# Patient Record
Sex: FEMALE | Race: BLACK OR AFRICAN AMERICAN | NOT HISPANIC OR LATINO
[De-identification: names, ages, dates, MRNs, and addresses within clinical notes are randomized per-mention and may not be internally consistent; named-entity substitution may affect disease eponyms.]

---

## 2017-01-06 ENCOUNTER — APPOINTMENT (OUTPATIENT)
Dept: OBGYN | Facility: CLINIC | Age: 42
End: 2017-01-06

## 2017-01-06 VITALS
BODY MASS INDEX: 34.04 KG/M2 | WEIGHT: 185 LBS | DIASTOLIC BLOOD PRESSURE: 74 MMHG | HEIGHT: 62 IN | SYSTOLIC BLOOD PRESSURE: 109 MMHG

## 2017-01-06 DIAGNOSIS — Z82.49 FAMILY HISTORY OF ISCHEMIC HEART DISEASE AND OTHER DISEASES OF THE CIRCULATORY SYSTEM: ICD-10-CM

## 2017-01-06 DIAGNOSIS — R42 DIZZINESS AND GIDDINESS: ICD-10-CM

## 2017-01-06 DIAGNOSIS — G43.909 MIGRAINE, UNSPECIFIED, NOT INTRACTABLE, W/OUT STATUS MIGRAINOSUS: ICD-10-CM

## 2017-01-06 DIAGNOSIS — Z87.59 PERSONAL HISTORY OF OTHER COMPLICATIONS OF PREGNANCY, CHILDBIRTH AND THE PUERPERIUM: ICD-10-CM

## 2017-01-06 RX ORDER — IBUPROFEN 800 MG
800 TABLET ORAL
Refills: 0 | Status: ACTIVE | COMMUNITY

## 2017-02-24 ENCOUNTER — OUTPATIENT (OUTPATIENT)
Dept: OUTPATIENT SERVICES | Facility: HOSPITAL | Age: 42
LOS: 1 days | End: 2017-02-24

## 2017-02-24 VITALS
TEMPERATURE: 98 F | DIASTOLIC BLOOD PRESSURE: 76 MMHG | OXYGEN SATURATION: 98 % | RESPIRATION RATE: 16 BRPM | WEIGHT: 186.07 LBS | HEIGHT: 62 IN | SYSTOLIC BLOOD PRESSURE: 118 MMHG | HEART RATE: 83 BPM

## 2017-02-24 DIAGNOSIS — N84.0 POLYP OF CORPUS UTERI: ICD-10-CM

## 2017-02-24 DIAGNOSIS — O03.9 COMPLETE OR UNSPECIFIED SPONTANEOUS ABORTION WITHOUT COMPLICATION: Chronic | ICD-10-CM

## 2017-02-24 DIAGNOSIS — Z98.890 OTHER SPECIFIED POSTPROCEDURAL STATES: Chronic | ICD-10-CM

## 2017-02-24 DIAGNOSIS — R23.8 OTHER SKIN CHANGES: ICD-10-CM

## 2017-02-24 LAB
APTT BLD: 37.2 SEC — SIGNIFICANT CHANGE UP (ref 27.5–37.4)
HCT VFR BLD CALC: 32.9 % — LOW (ref 34.5–45)
HGB BLD-MCNC: 10.5 G/DL — LOW (ref 11.5–15.5)
INR BLD: 1.2 — HIGH (ref 0.87–1.18)
MCHC RBC-ENTMCNC: 26.5 PG — LOW (ref 27–34)
MCHC RBC-ENTMCNC: 31.9 % — LOW (ref 32–36)
MCV RBC AUTO: 83.1 FL — SIGNIFICANT CHANGE UP (ref 80–100)
PLATELET # BLD AUTO: 346 K/UL — SIGNIFICANT CHANGE UP (ref 150–400)
PMV BLD: 10.2 FL — SIGNIFICANT CHANGE UP (ref 7–13)
PROTHROM AB SERPL-ACNC: 13.7 SEC — HIGH (ref 10–13.1)
RBC # BLD: 3.96 M/UL — SIGNIFICANT CHANGE UP (ref 3.8–5.2)
RBC # FLD: 14.2 % — SIGNIFICANT CHANGE UP (ref 10.3–14.5)
WBC # BLD: 7.62 K/UL — SIGNIFICANT CHANGE UP (ref 3.8–10.5)
WBC # FLD AUTO: 7.62 K/UL — SIGNIFICANT CHANGE UP (ref 3.8–10.5)

## 2017-02-24 RX ORDER — SODIUM CHLORIDE 9 MG/ML
1000 INJECTION, SOLUTION INTRAVENOUS
Qty: 0 | Refills: 0 | Status: DISCONTINUED | OUTPATIENT
Start: 2017-03-07 | End: 2017-03-08

## 2017-02-24 NOTE — H&P PST ADULT - HISTORY OF PRESENT ILLNESS
41 yo female with  PMH uterine fibroids presents to pre surgical testing. Pt states in July 2016 she started to experience menorrhagia.  Pt went to OB,  ultra sound done, nonconclusive. Hysteroscopy done Jan 2017, uterine polyps found.  Pt scheduled for dilation and curettahe hysteroscopy with symphion on 3/7/17. 43 yo female with PMH uterine fibroids presents to pre surgical testing. Pt states in July 2016 she started to experience menorrhagia.   Pt went to OB,  ultra sound done, nonconclusive. Hysteroscopy done Jan 2017, uterine polyps found. Pt states that menses is regular. Pt scheduled for dilation and curettage hysteroscopy with ashleyhion on 3/7/17.

## 2017-02-24 NOTE — H&P PST ADULT - NEGATIVE MUSCULOSKELETAL SYMPTOMS
no joint swelling/no muscle weakness/no back pain/no leg pain L/no arthritis/no muscle cramps/no stiffness/no arm pain L/no neck pain/no leg pain R/no arm pain R/no arthralgia/no myalgia

## 2017-02-24 NOTE — H&P PST ADULT - NEGATIVE ENMT SYMPTOMS
no ear pain/no recurrent cold sores/no sinus symptoms/no post-nasal discharge/no gum bleeding/no throat pain/no dysphagia/no vertigo/no nasal discharge/no abnormal taste sensation/no tinnitus/no hearing difficulty/no nasal congestion/no nasal obstruction/no nose bleeds/no dry mouth

## 2017-02-24 NOTE — H&P PST ADULT - LYMPHATIC
posterior cervical L/posterior cervical R/anterior cervical L/supraclavicular L/supraclavicular R/anterior cervical R

## 2017-02-24 NOTE — H&P PST ADULT - PROBLEM SELECTOR PLAN 1
Pt scheduled for dilation and curettage hysteroscopy with erwin on 3/7/17.   Pre op instructions including chlorhexidine wash given and pt abler to verbalize understanding. Sterile cup given, pt instructed to bring urine sample AM of surgery for UCG and pt able to verbalize understanding.

## 2017-02-24 NOTE — H&P PST ADULT - MUSCULOSKELETAL
details… detailed exam no joint swelling/no joint erythema/ROM intact/no joint warmth/no calf tenderness/normal strength

## 2017-02-24 NOTE — H&P PST ADULT - NEGATIVE NEUROLOGICAL SYMPTOMS
no transient paralysis/no weakness/no paresthesias/no hemiparesis/no difficulty walking/no loss of consciousness/no tremors/no headache/no focal seizures/no syncope/no confusion/no generalized seizures/no vertigo/no loss of sensation/no facial palsy

## 2017-02-24 NOTE — H&P PST ADULT - NSANTHOSAYNRD_GEN_A_CORE
No. ELAN screening performed.  STOP BANG Legend: 0-2 = LOW Risk; 3-4 = INTERMEDIATE Risk; 5-8 = HIGH Risk

## 2017-02-24 NOTE — H&P PST ADULT - NEGATIVE CARDIOVASCULAR SYMPTOMS
no orthopnea/no palpitations/no dyspnea on exertion/no claudication/no chest pain/no paroxysmal nocturnal dyspnea/no peripheral edema

## 2017-02-24 NOTE — H&P PST ADULT - GASTROINTESTINAL DETAILS
soft/nontender/no guarding/no organomegaly/no masses palpable/no rebound tenderness/bowel sounds normal/no bruit/no distention nontender/no distention/soft/no rebound tenderness/no masses palpable/bowel sounds normal

## 2017-02-24 NOTE — H&P PST ADULT - NEGATIVE OPHTHALMOLOGIC SYMPTOMS
no discharge L/no pain L/no irritation R/no diplopia/no blurred vision L/no pain R/no loss of vision L/no lacrimation R/no blurred vision R/no discharge R/no irritation L/no photophobia/no lacrimation L/no loss of vision R

## 2017-03-06 ENCOUNTER — RESULT REVIEW (OUTPATIENT)
Age: 42
End: 2017-03-06

## 2017-03-07 ENCOUNTER — OUTPATIENT (OUTPATIENT)
Dept: OUTPATIENT SERVICES | Facility: HOSPITAL | Age: 42
LOS: 1 days | Discharge: HOME | End: 2017-03-07
Payer: COMMERCIAL

## 2017-03-07 ENCOUNTER — APPOINTMENT (OUTPATIENT)
Dept: OBGYN | Facility: HOSPITAL | Age: 42
End: 2017-03-07

## 2017-03-07 ENCOUNTER — TRANSCRIPTION ENCOUNTER (OUTPATIENT)
Age: 42
End: 2017-03-07

## 2017-03-07 VITALS
TEMPERATURE: 98 F | DIASTOLIC BLOOD PRESSURE: 83 MMHG | SYSTOLIC BLOOD PRESSURE: 123 MMHG | OXYGEN SATURATION: 98 % | HEART RATE: 76 BPM | RESPIRATION RATE: 14 BRPM

## 2017-03-07 VITALS
OXYGEN SATURATION: 100 % | TEMPERATURE: 98 F | SYSTOLIC BLOOD PRESSURE: 131 MMHG | HEIGHT: 60 IN | RESPIRATION RATE: 16 BRPM | DIASTOLIC BLOOD PRESSURE: 66 MMHG | WEIGHT: 186.07 LBS | HEART RATE: 75 BPM

## 2017-03-07 DIAGNOSIS — N84.0 POLYP OF CORPUS UTERI: ICD-10-CM

## 2017-03-07 DIAGNOSIS — O03.9 COMPLETE OR UNSPECIFIED SPONTANEOUS ABORTION WITHOUT COMPLICATION: Chronic | ICD-10-CM

## 2017-03-07 DIAGNOSIS — Z98.890 OTHER SPECIFIED POSTPROCEDURAL STATES: Chronic | ICD-10-CM

## 2017-03-07 PROCEDURE — 88305 TISSUE EXAM BY PATHOLOGIST: CPT | Mod: 26

## 2017-03-07 PROCEDURE — 58562 HYSTEROSCOPY REMOVE FB: CPT

## 2017-03-07 RX ORDER — SODIUM CHLORIDE 9 MG/ML
1000 INJECTION, SOLUTION INTRAVENOUS
Qty: 0 | Refills: 0 | Status: DISCONTINUED | OUTPATIENT
Start: 2017-03-07 | End: 2017-03-08

## 2017-03-07 RX ADMIN — SODIUM CHLORIDE 125 MILLILITER(S): 9 INJECTION, SOLUTION INTRAVENOUS at 11:42

## 2017-03-07 NOTE — ASU DISCHARGE PLAN (ADULT/PEDIATRIC). - MEDICATION SUMMARY - MEDICATIONS TO TAKE
I will START or STAY ON the medications listed below when I get home from the hospital:    Motrin 800 mg oral tablet  -- 1 tab(s) by mouth 3 times a day, As Needed for migraines last dose on 3/1/17  -- Indication: For home med    meclizine 12.5 mg oral tablet  -- 1 tab(s) by mouth prn, As Needed  -- Indication: For home med    multivitamin  -- 1 tab(s) by mouth once a day in am last dose on 3/1/17  -- Indication: For home med

## 2017-03-07 NOTE — ASU DISCHARGE PLAN (ADULT/PEDIATRIC). - NURSING INSTRUCTIONS
You were given IV Tylenol for pain management.  Please DO NOT take tylenol for the next 8 hours (until _______ ).  You were given Toradol for pain management. Please DO Not take Motrin/Ibuprofen (NSAIDS) for the next 6 hours (Until _______). You were given IV Tylenol for pain management.  Please DO NOT take tylenol for the next 8 hours (until 700PM).  You were given Toradol for pain management. Please DO Not take Motrin/Ibuprofen (NSAIDS) for the next 6 hours (Until 700PM).

## 2017-03-07 NOTE — ASU DISCHARGE PLAN (ADULT/PEDIATRIC). - NOTIFY
Pain not relieved by Medications/Persistent Nausea and Vomiting/GYN Fever>100.4/Bleeding that does not stop/Unable to Urinate

## 2017-03-07 NOTE — ASU DISCHARGE PLAN (ADULT/PEDIATRIC). - ACTIVITY LEVEL
no weight bearing/no exercise/nothing per vagina/no tub baths/no douching/nothing per rectum/no tampons/no intercourse/no heavy lifting Nothing in vagina, no intercourse, no douching, no tampons, no tub baths, and no swimming for about 2 weeks or until cleared by MD. Contact your doctor if you are soaking a pad every hour or experience foul smelling discharge./no heavy lifting/no douching/no exercise/nothing per rectum/no tampons/nothing per vagina/no tub baths/no intercourse/no weight bearing

## 2017-03-22 ENCOUNTER — APPOINTMENT (OUTPATIENT)
Dept: OBGYN | Facility: CLINIC | Age: 42
End: 2017-03-22

## 2017-03-30 ENCOUNTER — APPOINTMENT (OUTPATIENT)
Dept: OBGYN | Facility: CLINIC | Age: 42
End: 2017-03-30

## 2017-03-30 VITALS — HEIGHT: 62 IN | BODY MASS INDEX: 34.04 KG/M2 | WEIGHT: 185 LBS

## 2017-03-30 DIAGNOSIS — N84.0 POLYP OF CORPUS UTERI: ICD-10-CM

## 2019-01-10 PROBLEM — N84.0 POLYP OF CORPUS UTERI: Chronic | Status: ACTIVE | Noted: 2017-02-24

## 2019-01-10 PROBLEM — D25.9 LEIOMYOMA OF UTERUS, UNSPECIFIED: Chronic | Status: ACTIVE | Noted: 2017-02-24

## 2019-01-11 ENCOUNTER — APPOINTMENT (OUTPATIENT)
Dept: OBGYN | Facility: CLINIC | Age: 44
End: 2019-01-11
Payer: COMMERCIAL

## 2019-01-11 VITALS
SYSTOLIC BLOOD PRESSURE: 108 MMHG | DIASTOLIC BLOOD PRESSURE: 75 MMHG | WEIGHT: 194 LBS | BODY MASS INDEX: 35.7 KG/M2 | HEIGHT: 62 IN

## 2019-01-11 DIAGNOSIS — Z09 ENCOUNTER FOR FOLLOW-UP EXAMINATION AFTER COMPLETED TREATMENT FOR CONDITIONS OTHER THAN MALIGNANT NEOPLASM: ICD-10-CM

## 2019-01-11 DIAGNOSIS — N89.8 OTHER SPECIFIED NONINFLAMMATORY DISORDERS OF VAGINA: ICD-10-CM

## 2019-01-11 DIAGNOSIS — Z01.419 ENCOUNTER FOR GYNECOLOGICAL EXAMINATION (GENERAL) (ROUTINE) W/OUT ABNORMAL FINDINGS: ICD-10-CM

## 2019-01-11 PROCEDURE — 99396 PREV VISIT EST AGE 40-64: CPT

## 2019-01-11 PROCEDURE — 99213 OFFICE O/P EST LOW 20 MIN: CPT | Mod: 25

## 2019-01-11 NOTE — CHIEF COMPLAINT
[Annual Visit] : annual visit [FreeTextEntry1] : Pt presents for annual\par Thinks she has a yeast infection because sees white discharge. Denies itching \par Has fibroids = would like to talk about removing them.  Has heavy periods, pelvic pain and dyspareunia.  Has 3 children, not sure if she is done with childbearing.

## 2019-01-11 NOTE — HISTORY OF PRESENT ILLNESS
[___ Year(s) Ago] : [unfilled] year(s) ago [Good] : being in good health [Last Mammogram ___] : Last Mammogram was [unfilled] [Last Pap ___] : Last cervical pap smear was [unfilled] [Reproductive Age] : is of reproductive age

## 2019-01-15 ENCOUNTER — RESULT REVIEW (OUTPATIENT)
Age: 44
End: 2019-01-15

## 2019-01-15 LAB
CANDIDA VAG CYTO: DETECTED
G VAGINALIS+PREV SP MTYP VAG QL MICRO: NOT DETECTED
HPV HIGH+LOW RISK DNA PNL CVX: NOT DETECTED
T VAGINALIS VAG QL WET PREP: NOT DETECTED

## 2019-01-16 RX ORDER — FLUCONAZOLE 150 MG/1
150 TABLET ORAL
Qty: 1 | Refills: 0 | Status: DISCONTINUED | COMMUNITY
Start: 2019-01-15 | End: 2019-01-15

## 2019-01-17 LAB — CYTOLOGY CVX/VAG DOC THIN PREP: NORMAL

## 2019-01-23 ENCOUNTER — APPOINTMENT (OUTPATIENT)
Dept: OBGYN | Facility: CLINIC | Age: 44
End: 2019-01-23

## 2019-07-12 NOTE — ASU PREOP CHECKLIST - PATIENT'S PERSONAL PROPERTY GIVEN TO
Patient is interested in Urolift procedure but wants to know if his insurance covers this procedure.  Info given to preverification specialist.   family member

## 2019-07-14 NOTE — H&P PST ADULT - FAMILY HISTORY
Speaking Coherently Mother  Still living? Yes, Estimated age: Age Unknown  Family history of hypertension, Age at diagnosis: Age Unknown

## 2020-02-24 ENCOUNTER — EMERGENCY (EMERGENCY)
Facility: HOSPITAL | Age: 45
LOS: 1 days | Discharge: HOME | End: 2020-02-24
Attending: EMERGENCY MEDICINE
Payer: COMMERCIAL

## 2020-02-24 VITALS
WEIGHT: 195.11 LBS | RESPIRATION RATE: 18 BRPM | TEMPERATURE: 99 F | DIASTOLIC BLOOD PRESSURE: 81 MMHG | OXYGEN SATURATION: 100 % | HEART RATE: 89 BPM | HEIGHT: 62 IN | SYSTOLIC BLOOD PRESSURE: 116 MMHG

## 2020-02-24 VITALS
DIASTOLIC BLOOD PRESSURE: 84 MMHG | TEMPERATURE: 98 F | RESPIRATION RATE: 16 BRPM | SYSTOLIC BLOOD PRESSURE: 134 MMHG | OXYGEN SATURATION: 99 % | HEART RATE: 81 BPM

## 2020-02-24 DIAGNOSIS — O03.9 COMPLETE OR UNSPECIFIED SPONTANEOUS ABORTION WITHOUT COMPLICATION: Chronic | ICD-10-CM

## 2020-02-24 DIAGNOSIS — Z98.890 OTHER SPECIFIED POSTPROCEDURAL STATES: Chronic | ICD-10-CM

## 2020-02-24 LAB
FLU A RESULT: DETECTED
FLU A RESULT: DETECTED
FLUAV AG NPH QL: DETECTED
FLUBV AG NPH QL: SIGNIFICANT CHANGE UP
RSV RESULT: SIGNIFICANT CHANGE UP
RSV RNA RESP QL NAA+PROBE: SIGNIFICANT CHANGE UP

## 2020-02-24 PROCEDURE — 99284 EMERGENCY DEPT VISIT MOD MDM: CPT

## 2020-02-24 PROCEDURE — 71046 X-RAY EXAM CHEST 2 VIEWS: CPT

## 2020-02-24 PROCEDURE — 71046 X-RAY EXAM CHEST 2 VIEWS: CPT | Mod: 26

## 2020-02-24 PROCEDURE — 87631 RESP VIRUS 3-5 TARGETS: CPT

## 2020-02-24 PROCEDURE — 99283 EMERGENCY DEPT VISIT LOW MDM: CPT | Mod: 25

## 2020-02-24 RX ORDER — ACETAMINOPHEN 500 MG
975 TABLET ORAL ONCE
Refills: 0 | Status: COMPLETED | OUTPATIENT
Start: 2020-02-24 | End: 2020-02-24

## 2020-02-24 RX ORDER — SODIUM CHLORIDE 9 MG/ML
1000 INJECTION, SOLUTION INTRAVENOUS ONCE
Refills: 0 | Status: COMPLETED | OUTPATIENT
Start: 2020-02-24 | End: 2020-02-24

## 2020-02-24 RX ADMIN — Medication 975 MILLIGRAM(S): at 12:00

## 2020-02-24 RX ADMIN — Medication 75 MILLIGRAM(S): at 14:22

## 2020-02-24 RX ADMIN — Medication 975 MILLIGRAM(S): at 11:56

## 2020-02-24 RX ADMIN — SODIUM CHLORIDE 2000 MILLILITER(S): 9 INJECTION, SOLUTION INTRAVENOUS at 11:56

## 2020-02-24 NOTE — ED ADULT NURSE NOTE - OBJECTIVE STATEMENT
46 y/o female came in from home complaining of flu-like symptoms for 3 days. AOx4, ambulatory with no significant PMH. Pt. states she has been experiencing fever, body aches, and productive cough for 3 days. Pt. is also around a child with similar symptoms. Pt. reports she did not get the flu shot this year. Pt. denies any abdominal pain, chest pain, N/V/D. Pt. febrile to 101.4 F oral as per triage vital signs. Tylenol administered as per EMAR. Denies any SOB. 20G RAC. pt placed in hospital gown, bed locked and in lowest position with the call bell in reach.

## 2020-02-24 NOTE — ED PROVIDER NOTE - PROGRESS NOTE DETAILS
Attempted to reach patient, she did not answer. [employer] message sent to patient with this information.   Dr. Chava Mesa, PGY-2: flu A positive, informed pt. Tolerating PO. Vitals improved. Offered tamiflu given symptoms started less than 48 hours ago. Informed pt of side effects of tamiflu. Pt would like to start tamiflu. Will give dose here and send prescription to pharmacy. Recommended symptomatic treatment at home. Return precautions provided, pt verbalized understanding. Ready for DC. MD Rylan: patient reevaluated, reports feeling improved, on exam well appearing, lung exam unchanged, now afebrile, will give Tamiflu, strict return precautions given including worsening shortness of breath, chest pain, cough, fever, abdominal pain, or if she had any other new worsening, or concerning symptoms, advised to follow up with primary medical doctor in 2-3 days for reevaluation.

## 2020-02-24 NOTE — ED PROVIDER NOTE - PHYSICAL EXAMINATION
Gen: NAD, AOx3, able to make needs known, non-toxic  Head: NCAT  HEENT: EOMI, oral mucosa moist, normal conjunctiva  Lung: no respiratory distress, speaking in full sentences, crackles L sided, R lung CTA  CV: RRR, no murmurs  Abd: soft, NTND, no guarding  MSK: no visible deformities  Neuro: No focal sensory or motor deficits  Skin: Warm, well perfused  Psych: normal affect

## 2020-02-24 NOTE — ED PROVIDER NOTE - OBJECTIVE STATEMENT
46 y/o F w/ PMH of uterine fibroids presenting w/ flu-like symptoms. Gold room 6. Pt reports Saturday night developing cough. Since then has had headache, body aches, and fevers. Cough productive of sputum. Was concerned because she has been having persistent fevers. Has not been taking medication for the fever. Has been taking mucinex for cough/congestion, last took last night. Did not get flu vaccine this year. Daughter sick w/ URI symptoms recently, but improved after 1 day. No known other sick contacts. Denies dizziness, blurred vision, chest pain, shortness of breath, abdominal pain, n/v/d/c, urinary symptoms, rash.

## 2020-02-24 NOTE — ED PROVIDER NOTE - PATIENT PORTAL LINK FT
You can access the FollowMyHealth Patient Portal offered by [facility] by registering at the following website: [email or web address]. By joining [employer]’s FollowMyHealth portal, you will also be able to view your health information using other applications (apps) compatible with our system.

## 2020-02-24 NOTE — ED PROVIDER NOTE - NSFOLLOWUPINSTRUCTIONS_ED_ALL_ED_FT
You were diagnosed with the flu    Viral Respiratory Infection    A viral respiratory infection is an illness that affects parts of the body used for breathing, like the lungs, nose, and throat. It is caused by a germ called a virus. Symptoms can include runny nose, coughing, sneezing, fatigue, body aches, sore throat, fever, or headache. Over the counter medicine can be used to manage the symptoms but the infection typically goes away on its own in 5 to 10 days.     SEEK IMMEDIATE MEDICAL CARE IF YOU HAVE ANY OF THE FOLLOWING SYMPTOMS: shortness of breath, chest pain, fever over 10 days, or lightheadedness/dizziness.     1) Follow up with your doctor this week  2) Return to the ED immediately for new or worsening symptoms   3) Please continue to take any home medications as prescribed  4) Your test results from your ED visit were discussed with you prior to discharge  5) You were provided with a copy of your test results  6) Please  your medications at the pharmacy and take as directed  7) Please take tylenol 650 mg every 4 hours as needed for pain. Please do not exceed more than 4,000mg of Tylenol in a day   8) Please take Motrin 600mg by mouth every 6 hours as needed for pain. Please take this medication with food.

## 2020-02-24 NOTE — ED PROVIDER NOTE - NSFOLLOWUPCLINICS_GEN_ALL_ED_FT
[facility] General Internal Medicine  General Internal Medicine  [address] [place] [address]  Phone: [phone]  Fax:   Follow Up Time:     [facility]  Internal Medicine  [address] [place] [address]  Phone: [phone]  Fax: [phone]  Follow Up Time:

## 2020-02-24 NOTE — ED PROVIDER NOTE - CLINICAL SUMMARY MEDICAL DECISION MAKING FREE TEXT BOX
46 y/o F w/ PMH of uterine fibroids presenting w/ flu-like symptoms. Given symptoms, likely flu vs. other viral URI. Pt well appearing at this time. L sided crackles, possible pneumonia. Plan for CXR, fluids, tylenol, u-preg. Expect DC after symptomatic improvement. Will reassess the need for additional interventions as clinically warranted.

## 2020-02-24 NOTE — ED PROVIDER NOTE - ATTENDING CONTRIBUTION TO CARE
45 year old female, no past medical history, presents to the ED for generalized malaise. Reports for past 2 days, has had diffuse myalgias, non-productive cough, subjective fevers and chills. No sick contacts or recent travel.     Gen: Well appearing, Well Developed, No Acute Distress  HEENT: Normocephalic, Atraumatic, Pupils equal round and reactive to light, Mucous Membranes dry Trachea Midline, Pharynx non-erythematous  Cards: Regular Rate and Rhythm, No murmurs, No JVD, No pedal edema  Pulm: Lungs with mild rales LLL otherwise normal, no respiratory distress, no accessory muscle use  Abd: abdomen soft, supple, non-tender, no rebound or guarding, bowel sounds normoactive x4, no masses, no pulsatile masses,   Ext: moving all extremities, pulses x4 strong and regular  Neuro: AxOx3, no focal neuro deficits   Back: no tenderness  Skin: warm, dry, no rash     A/P:45 year old female, no past medical history, presents to the ED for generalized malaise, cough, myalgias, likely flu, vs viral syndrome, r/o pneumonia given mild rales LLL, check flu swab, cxr, rehydrate, pain/fever control and reeval.

## 2020-02-28 ENCOUNTER — APPOINTMENT (OUTPATIENT)
Dept: OBGYN | Facility: CLINIC | Age: 45
End: 2020-02-28

## 2020-03-09 ENCOUNTER — EMERGENCY (EMERGENCY)
Facility: HOSPITAL | Age: 45
LOS: 1 days | Discharge: HOME | End: 2020-03-09
Attending: EMERGENCY MEDICINE
Payer: COMMERCIAL

## 2020-03-09 VITALS
HEART RATE: 73 BPM | DIASTOLIC BLOOD PRESSURE: 78 MMHG | SYSTOLIC BLOOD PRESSURE: 125 MMHG | TEMPERATURE: 99 F | RESPIRATION RATE: 18 BRPM | WEIGHT: 184.97 LBS | OXYGEN SATURATION: 99 % | HEIGHT: 62 IN

## 2020-03-09 VITALS
TEMPERATURE: 98 F | HEART RATE: 71 BPM | DIASTOLIC BLOOD PRESSURE: 79 MMHG | OXYGEN SATURATION: 99 % | RESPIRATION RATE: 17 BRPM | SYSTOLIC BLOOD PRESSURE: 112 MMHG

## 2020-03-09 DIAGNOSIS — O03.9 COMPLETE OR UNSPECIFIED SPONTANEOUS ABORTION WITHOUT COMPLICATION: Chronic | ICD-10-CM

## 2020-03-09 DIAGNOSIS — Z98.890 OTHER SPECIFIED POSTPROCEDURAL STATES: Chronic | ICD-10-CM

## 2020-03-09 LAB
ALBUMIN SERPL ELPH-MCNC: 4 G/DL — SIGNIFICANT CHANGE UP (ref 3.3–5)
ALP SERPL-CCNC: 53 U/L — SIGNIFICANT CHANGE UP (ref 40–120)
ALT FLD-CCNC: 10 U/L — SIGNIFICANT CHANGE UP (ref 10–45)
ANION GAP SERPL CALC-SCNC: 10 MMOL/L — SIGNIFICANT CHANGE UP (ref 5–17)
AST SERPL-CCNC: 11 U/L — SIGNIFICANT CHANGE UP (ref 10–40)
BASOPHILS # BLD AUTO: 0.04 K/UL — SIGNIFICANT CHANGE UP (ref 0–0.2)
BASOPHILS NFR BLD AUTO: 0.7 % — SIGNIFICANT CHANGE UP (ref 0–2)
BILIRUB SERPL-MCNC: 0.9 MG/DL — SIGNIFICANT CHANGE UP (ref 0.2–1.2)
BUN SERPL-MCNC: 12 MG/DL — SIGNIFICANT CHANGE UP (ref 7–23)
CALCIUM SERPL-MCNC: 9.5 MG/DL — SIGNIFICANT CHANGE UP (ref 8.4–10.5)
CHLORIDE SERPL-SCNC: 105 MMOL/L — SIGNIFICANT CHANGE UP (ref 96–108)
CO2 SERPL-SCNC: 24 MMOL/L — SIGNIFICANT CHANGE UP (ref 22–31)
CREAT SERPL-MCNC: 0.75 MG/DL — SIGNIFICANT CHANGE UP (ref 0.5–1.3)
EOSINOPHIL # BLD AUTO: 0.1 K/UL — SIGNIFICANT CHANGE UP (ref 0–0.5)
EOSINOPHIL NFR BLD AUTO: 1.6 % — SIGNIFICANT CHANGE UP (ref 0–6)
GLUCOSE SERPL-MCNC: 92 MG/DL — SIGNIFICANT CHANGE UP (ref 70–99)
HCT VFR BLD CALC: 36.2 % — SIGNIFICANT CHANGE UP (ref 34.5–45)
HGB BLD-MCNC: 11 G/DL — LOW (ref 11.5–15.5)
IMM GRANULOCYTES NFR BLD AUTO: 0.2 % — SIGNIFICANT CHANGE UP (ref 0–1.5)
LYMPHOCYTES # BLD AUTO: 2.76 K/UL — SIGNIFICANT CHANGE UP (ref 1–3.3)
LYMPHOCYTES # BLD AUTO: 45 % — HIGH (ref 13–44)
MCHC RBC-ENTMCNC: 25.6 PG — LOW (ref 27–34)
MCHC RBC-ENTMCNC: 30.4 GM/DL — LOW (ref 32–36)
MCV RBC AUTO: 84.4 FL — SIGNIFICANT CHANGE UP (ref 80–100)
MONOCYTES # BLD AUTO: 0.37 K/UL — SIGNIFICANT CHANGE UP (ref 0–0.9)
MONOCYTES NFR BLD AUTO: 6 % — SIGNIFICANT CHANGE UP (ref 2–14)
NEUTROPHILS # BLD AUTO: 2.86 K/UL — SIGNIFICANT CHANGE UP (ref 1.8–7.4)
NEUTROPHILS NFR BLD AUTO: 46.5 % — SIGNIFICANT CHANGE UP (ref 43–77)
NRBC # BLD: 0 /100 WBCS — SIGNIFICANT CHANGE UP (ref 0–0)
PLATELET # BLD AUTO: 352 K/UL — SIGNIFICANT CHANGE UP (ref 150–400)
POTASSIUM SERPL-MCNC: 3.8 MMOL/L — SIGNIFICANT CHANGE UP (ref 3.5–5.3)
POTASSIUM SERPL-SCNC: 3.8 MMOL/L — SIGNIFICANT CHANGE UP (ref 3.5–5.3)
PROT SERPL-MCNC: 7.8 G/DL — SIGNIFICANT CHANGE UP (ref 6–8.3)
RBC # BLD: 4.29 M/UL — SIGNIFICANT CHANGE UP (ref 3.8–5.2)
RBC # FLD: 14.6 % — HIGH (ref 10.3–14.5)
SODIUM SERPL-SCNC: 139 MMOL/L — SIGNIFICANT CHANGE UP (ref 135–145)
TROPONIN T, HIGH SENSITIVITY RESULT: <6 NG/L — SIGNIFICANT CHANGE UP (ref 0–51)
WBC # BLD: 6.14 K/UL — SIGNIFICANT CHANGE UP (ref 3.8–10.5)
WBC # FLD AUTO: 6.14 K/UL — SIGNIFICANT CHANGE UP (ref 3.8–10.5)

## 2020-03-09 PROCEDURE — 99284 EMERGENCY DEPT VISIT MOD MDM: CPT | Mod: 25

## 2020-03-09 PROCEDURE — 71046 X-RAY EXAM CHEST 2 VIEWS: CPT | Mod: 26

## 2020-03-09 PROCEDURE — 99285 EMERGENCY DEPT VISIT HI MDM: CPT

## 2020-03-09 PROCEDURE — 93005 ELECTROCARDIOGRAM TRACING: CPT

## 2020-03-09 PROCEDURE — 71046 X-RAY EXAM CHEST 2 VIEWS: CPT

## 2020-03-09 PROCEDURE — 84484 ASSAY OF TROPONIN QUANT: CPT

## 2020-03-09 PROCEDURE — 80053 COMPREHEN METABOLIC PANEL: CPT

## 2020-03-09 PROCEDURE — 85027 COMPLETE CBC AUTOMATED: CPT

## 2020-03-09 RX ORDER — ACETAMINOPHEN 500 MG
650 TABLET ORAL ONCE
Refills: 0 | Status: COMPLETED | OUTPATIENT
Start: 2020-03-09 | End: 2020-03-09

## 2020-03-09 RX ORDER — IBUPROFEN 200 MG
600 TABLET ORAL ONCE
Refills: 0 | Status: COMPLETED | OUTPATIENT
Start: 2020-03-09 | End: 2020-03-09

## 2020-03-09 RX ADMIN — Medication 650 MILLIGRAM(S): at 12:40

## 2020-03-09 RX ADMIN — Medication 600 MILLIGRAM(S): at 12:40

## 2020-03-09 NOTE — ED PROVIDER NOTE - NSFOLLOWUPINSTRUCTIONS_ED_ALL_ED_FT
- Lab and imaging results, if performed, were discussed with you along with your discharge diagnosis    - Follow up with your doctor in 1 week - bring copies of your results if you were given. If you do not have a primary doctor, please call [phone] to find one convenient for you    - Return to the ED for any new, worsening, or concerning symptoms to you    - Continue all prescribed medications    - Take ibuprofen/tylenol as directed as needed for pain  To control your pain at home, you should take Ibuprofen 400 mg along with Tylenol 650mg-1000mg every 6 to 8 hours. Limit your maximum daily Tylenol from all sources to 4000mg. Be aware that many other medications contain acetaminophen which is also known as Tylenol. Taking Tylenol and Ibuprofen together has been shown to be more effective at relieving pain than taking them separately. These are both over the counter medications that you can  at your local pharmacy without a prescription. You need to respect all of the warnings on the bottles. You shouldn’t take these medications for more than a week without following up with your doctor. Both medications come with certain risks and side effects that you need to discuss with your doctor, especially if you are taking them for a prolonged period.    - Rest and keep yourself hydrated with fluids

## 2020-03-09 NOTE — ED ADULT TRIAGE NOTE - CHIEF COMPLAINT QUOTE
left sided chest pain radiating down the left arm with numbness to the left thumb x the passed 3 days, had flu last week and was taking tamiflu.

## 2020-03-09 NOTE — ED PROVIDER NOTE - NS ED ROS FT
CONST: no fevers, no chills  EYES: no pain, no vision changes  ENT: no sore throat, no ear pain, no change in hearing  CV: + chest pain, no palpitations, no leg swelling  RESP: no shortness of breath, no cough  ABD: no abdominal pain, no nausea, no vomiting, no diarrhea  : no dysuria, no flank pain, no hematuria  MSK: no back pain, no extremity pain  NEURO: +L arm numbness, no headache or additional neurologic complaints   HEME: no easy bleeding  SKIN:  no rash

## 2020-03-09 NOTE — ED PROVIDER NOTE - PATIENT PORTAL LINK FT
You can access the FollowMyHealth Patient Portal offered by [facility] by registering at the following website: [email or web address]. By joining [employer]’s FollowMyHealth portal, you will also be able to view your health information using other applications (apps) compatible with our system.

## 2020-03-09 NOTE — ED PROVIDER NOTE - CLINICAL SUMMARY MEDICAL DECISION MAKING FREE TEXT BOX
45y F w/ no significant PMHx presenting with L-sided cp radiating to her L arm w/ associated numbness/tingling x3 days. HEART Score 1, low suspicion of ACS, with pain and numbness reproducible on PE. Likely MSK in nature, will w/u for ACS, will treat patient's pain and reassess, patient refused Valium, will treat w/ Tylenol/Motrin. 45y F w/ no significant PMHx presenting with L-sided cp radiating to her L arm w/ associated numbness/tingling x3 days. HEART Score 1, low suspicion of ACS, with pain and numbness reproducible on PE. Likely MSK in nature, will w/u for ACS, will treat patient's pain and reassess, patient refused Valium, will treat w/ Tylenol/Motrin.  Yusuf: Patient with cp radiating to left arm x 3 days. will get labs, ekg, cxr, cardiac enzymes. likely outpatient f/u. patient has worsening pain with movmnt.

## 2020-03-09 NOTE — ED PROVIDER NOTE - PROGRESS NOTE DETAILS
Patient endorses alleviation of pain of LUE after pain medications administered. Discussed f/u instructions and strict return precautions, patient agreeable. Patient medically stable for discharge.   Ely Drummond D.O. (PGY-1)

## 2020-03-09 NOTE — ED PROVIDER NOTE - PHYSICAL EXAMINATION
Physical Exam:  Gen: NAD, non-toxic appearing, awake alert   Head: NCAT  HEENT: EOMI, PEERL, normal conjunctiva, oral mucosa moist  Lung: CTAB, no respiratory distress, no wheezes/rhonchi/rales B/L, speaking in full sentences  CV: RRR, no murmurs, rubs or gallops  Chest wall: +ttp to L anterior chest wall   Abd: soft, NT, ND, no guarding, no rigidity  MSK: no visible deformities, ROM normal in UE/LE, no spinal tenderness   Neuro: +reverse phalen's test, CN2-12 grossly intact, A&Ox3, MS +5/5 in UE and LE BL, finger to nose smooth and rapid, gross sensation intact in UE and LE BL, gait smooth and coordinated  Skin: Warm, well perfused, no rash, no leg swelling  Psych: normal affect, calm  ~Ely Drummond D.O. -Resident

## 2020-03-09 NOTE — ED ADULT NURSE NOTE - OBJECTIVE STATEMENT
45y female arrived to ED complaining of CP. Patient reports CP x3 days, constant, L-sided and radiating down left arm +numbness down the left arm. Pain described as sharp. Patient reports that pain is not worsened or relieved by much. Patient denies SOB, n/v/d, abd pain, chills, fever. Patient endorses cough, recently dx w/ flu and finished course of Tamiflu. Pt A&Ox4, ambulatory, VS stable. Cardiac monitor placed on pt at bedside. No significant PMHx.

## 2020-03-09 NOTE — ED PROVIDER NOTE - OBJECTIVE STATEMENT
45y F w/ no significant PMHx presenting with L-sided cp radiating to her L arm w/ associated numbness/tingling x3 days. Patient states pain has been constant in nature since onset. States her chest pain is exacerbated with coughing. States she was diagnosed with the flu one week ago, endorses alleviation of her flu-like symptoms. Patient states L arm numbness and chest pain reproducible with movement. Denies associated diaphoresis, dizziness, headache, visual changes, sob, nausea, vomiting, focal weakness/numbness.

## 2020-03-29 ENCOUNTER — TRANSCRIPTION ENCOUNTER (OUTPATIENT)
Age: 45
End: 2020-03-29

## 2020-12-21 PROBLEM — Z01.419 ENCOUNTER FOR ANNUAL ROUTINE GYNECOLOGICAL EXAMINATION: Status: RESOLVED | Noted: 2019-01-11 | Resolved: 2020-12-21

## 2021-02-12 ENCOUNTER — APPOINTMENT (OUTPATIENT)
Dept: OBGYN | Facility: CLINIC | Age: 46
End: 2021-02-12

## 2021-03-11 ENCOUNTER — APPOINTMENT (OUTPATIENT)
Dept: OBGYN | Facility: CLINIC | Age: 46
End: 2021-03-11
Payer: COMMERCIAL

## 2021-03-11 VITALS
HEIGHT: 62 IN | WEIGHT: 185 LBS | DIASTOLIC BLOOD PRESSURE: 94 MMHG | BODY MASS INDEX: 34.04 KG/M2 | SYSTOLIC BLOOD PRESSURE: 128 MMHG

## 2021-03-11 DIAGNOSIS — Z01.419 ENCOUNTER FOR GYNECOLOGICAL EXAMINATION (GENERAL) (ROUTINE) W/OUT ABNORMAL FINDINGS: ICD-10-CM

## 2021-03-11 DIAGNOSIS — A60.00 HERPESVIRAL INFECTION OF UROGENITAL SYSTEM, UNSPECIFIED: ICD-10-CM

## 2021-03-11 PROCEDURE — 99396 PREV VISIT EST AGE 40-64: CPT

## 2021-03-11 PROCEDURE — 99072 ADDL SUPL MATRL&STAF TM PHE: CPT

## 2021-03-11 PROCEDURE — 36415 COLL VENOUS BLD VENIPUNCTURE: CPT

## 2021-03-11 NOTE — DISCUSSION/SUMMARY
[FreeTextEntry1] : reviewed management options discussed last time \par pt would like to consider [place]\par pt would need EMB first - pt thinks she would prefer under anesthesia - will start with funmi

## 2021-03-11 NOTE — PHYSICAL EXAM
[Appropriately responsive] : appropriately responsive [Alert] : alert [No Acute Distress] : no acute distress [Soft] : soft [Non-tender] : non-tender [Non-distended] : non-distended [No Lesions] : no lesions [No Mass] : no mass [Oriented x3] : oriented x3 [Examination Of The Breasts] : a normal appearance [No Masses] : no breast masses were palpable [Labia Majora] : normal [Labia Minora] : normal [Normal] : normal [Uterine Adnexae] : normal [FreeTextEntry1] : +ulceration right labia - pt reports h/o hsv

## 2021-03-11 NOTE — HISTORY OF PRESENT ILLNESS
[TextBox_4] : Pt states her stomach got really big and can feel the fibroids moving sometimes.  Period is off - spots for 3 days before period comes and then gets period for 3 days. Used to be very heavy and last longer.   [PapSmeardate] : 2019

## 2021-03-12 LAB — HIV1+2 AB SPEC QL IA.RAPID: NONREACTIVE

## 2021-03-15 LAB
C TRACH RRNA SPEC QL NAA+PROBE: NOT DETECTED
HBV SURFACE AG SER QL: NONREACTIVE
HCV AB SER QL: NONREACTIVE
HCV S/CO RATIO: 0.09 S/CO
HPV HIGH+LOW RISK DNA PNL CVX: NOT DETECTED
N GONORRHOEA RRNA SPEC QL NAA+PROBE: NOT DETECTED
SOURCE AMPLIFICATION: NORMAL
SOURCE AMPLIFICATION: NORMAL
T PALLIDUM AB SER QL IA: NEGATIVE
T VAGINALIS RRNA SPEC QL NAA+PROBE: NOT DETECTED

## 2021-03-17 LAB — CYTOLOGY CVX/VAG DOC THIN PREP: ABNORMAL

## 2021-03-23 ENCOUNTER — APPOINTMENT (OUTPATIENT)
Dept: OBGYN | Facility: CLINIC | Age: 46
End: 2021-03-23
Payer: COMMERCIAL

## 2021-03-23 ENCOUNTER — ASOB RESULT (OUTPATIENT)
Age: 46
End: 2021-03-23

## 2021-03-23 PROCEDURE — 76830 TRANSVAGINAL US NON-OB: CPT

## 2021-03-23 PROCEDURE — 99072 ADDL SUPL MATRL&STAF TM PHE: CPT

## 2021-03-26 ENCOUNTER — NON-APPOINTMENT (OUTPATIENT)
Age: 46
End: 2021-03-26

## 2021-04-08 ENCOUNTER — RESULT REVIEW (OUTPATIENT)
Age: 46
End: 2021-04-08

## 2021-04-08 ENCOUNTER — OUTPATIENT (OUTPATIENT)
Dept: OUTPATIENT SERVICES | Facility: HOSPITAL | Age: 46
LOS: 1 days | End: 2021-04-08
Payer: COMMERCIAL

## 2021-04-08 ENCOUNTER — APPOINTMENT (OUTPATIENT)
Dept: MAMMOGRAPHY | Facility: CLINIC | Age: 46
End: 2021-04-08
Payer: COMMERCIAL

## 2021-04-08 DIAGNOSIS — Z01.419 ENCOUNTER FOR GYNECOLOGICAL EXAMINATION (GENERAL) (ROUTINE) WITHOUT ABNORMAL FINDINGS: ICD-10-CM

## 2021-04-08 DIAGNOSIS — Z98.890 OTHER SPECIFIED POSTPROCEDURAL STATES: Chronic | ICD-10-CM

## 2021-04-08 DIAGNOSIS — O03.9 COMPLETE OR UNSPECIFIED SPONTANEOUS ABORTION WITHOUT COMPLICATION: Chronic | ICD-10-CM

## 2021-04-08 DIAGNOSIS — Z00.8 ENCOUNTER FOR OTHER GENERAL EXAMINATION: ICD-10-CM

## 2021-04-08 PROCEDURE — 77063 BREAST TOMOSYNTHESIS BI: CPT | Mod: 26

## 2021-04-08 PROCEDURE — 77067 SCR MAMMO BI INCL CAD: CPT | Mod: 26

## 2021-04-08 PROCEDURE — 77063 BREAST TOMOSYNTHESIS BI: CPT

## 2021-04-08 PROCEDURE — 77067 SCR MAMMO BI INCL CAD: CPT

## 2021-04-14 ENCOUNTER — EMERGENCY (EMERGENCY)
Facility: HOSPITAL | Age: 46
LOS: 1 days | Discharge: HOME | End: 2021-04-14
Attending: EMERGENCY MEDICINE
Payer: COMMERCIAL

## 2021-04-14 VITALS
OXYGEN SATURATION: 100 % | DIASTOLIC BLOOD PRESSURE: 81 MMHG | HEART RATE: 80 BPM | TEMPERATURE: 98 F | RESPIRATION RATE: 18 BRPM | HEIGHT: 62 IN | WEIGHT: 184.97 LBS | SYSTOLIC BLOOD PRESSURE: 116 MMHG

## 2021-04-14 VITALS
OXYGEN SATURATION: 98 % | TEMPERATURE: 98 F | DIASTOLIC BLOOD PRESSURE: 86 MMHG | SYSTOLIC BLOOD PRESSURE: 114 MMHG | RESPIRATION RATE: 18 BRPM | HEART RATE: 78 BPM

## 2021-04-14 DIAGNOSIS — O03.9 COMPLETE OR UNSPECIFIED SPONTANEOUS ABORTION WITHOUT COMPLICATION: Chronic | ICD-10-CM

## 2021-04-14 DIAGNOSIS — Z98.890 OTHER SPECIFIED POSTPROCEDURAL STATES: Chronic | ICD-10-CM

## 2021-04-14 LAB
ALBUMIN SERPL ELPH-MCNC: 4 G/DL — SIGNIFICANT CHANGE UP (ref 3.3–5)
ALP SERPL-CCNC: 64 U/L — SIGNIFICANT CHANGE UP (ref 40–120)
ALT FLD-CCNC: 11 U/L — SIGNIFICANT CHANGE UP (ref 10–45)
ANION GAP SERPL CALC-SCNC: 11 MMOL/L — SIGNIFICANT CHANGE UP (ref 5–17)
APPEARANCE UR: CLEAR — SIGNIFICANT CHANGE UP
AST SERPL-CCNC: 18 U/L — SIGNIFICANT CHANGE UP (ref 10–40)
BACTERIA # UR AUTO: NEGATIVE — SIGNIFICANT CHANGE UP
BASOPHILS # BLD AUTO: 0.04 K/UL — SIGNIFICANT CHANGE UP (ref 0–0.2)
BASOPHILS NFR BLD AUTO: 0.5 % — SIGNIFICANT CHANGE UP (ref 0–2)
BILIRUB SERPL-MCNC: 0.6 MG/DL — SIGNIFICANT CHANGE UP (ref 0.2–1.2)
BILIRUB UR-MCNC: NEGATIVE — SIGNIFICANT CHANGE UP
BUN SERPL-MCNC: 12 MG/DL — SIGNIFICANT CHANGE UP (ref 7–23)
CALCIUM SERPL-MCNC: 9.1 MG/DL — SIGNIFICANT CHANGE UP (ref 8.4–10.5)
CHLORIDE SERPL-SCNC: 107 MMOL/L — SIGNIFICANT CHANGE UP (ref 96–108)
CO2 SERPL-SCNC: 25 MMOL/L — SIGNIFICANT CHANGE UP (ref 22–31)
COLOR SPEC: YELLOW — SIGNIFICANT CHANGE UP
CREAT SERPL-MCNC: 0.7 MG/DL — SIGNIFICANT CHANGE UP (ref 0.5–1.3)
DIFF PNL FLD: NEGATIVE — SIGNIFICANT CHANGE UP
EOSINOPHIL # BLD AUTO: 0.12 K/UL — SIGNIFICANT CHANGE UP (ref 0–0.5)
EOSINOPHIL NFR BLD AUTO: 1.4 % — SIGNIFICANT CHANGE UP (ref 0–6)
EPI CELLS # UR: 1 /HPF — SIGNIFICANT CHANGE UP
GLUCOSE SERPL-MCNC: 88 MG/DL — SIGNIFICANT CHANGE UP (ref 70–99)
GLUCOSE UR QL: NEGATIVE — SIGNIFICANT CHANGE UP
HCG SERPL-ACNC: <2 MIU/ML — SIGNIFICANT CHANGE UP
HCT VFR BLD CALC: 39.1 % — SIGNIFICANT CHANGE UP (ref 34.5–45)
HGB BLD-MCNC: 12.1 G/DL — SIGNIFICANT CHANGE UP (ref 11.5–15.5)
HYALINE CASTS # UR AUTO: 1 /LPF — SIGNIFICANT CHANGE UP (ref 0–2)
IMM GRANULOCYTES NFR BLD AUTO: 0.2 % — SIGNIFICANT CHANGE UP (ref 0–1.5)
KETONES UR-MCNC: NEGATIVE — SIGNIFICANT CHANGE UP
LEUKOCYTE ESTERASE UR-ACNC: NEGATIVE — SIGNIFICANT CHANGE UP
LIDOCAIN IGE QN: 25 U/L — SIGNIFICANT CHANGE UP (ref 7–60)
LYMPHOCYTES # BLD AUTO: 2.65 K/UL — SIGNIFICANT CHANGE UP (ref 1–3.3)
LYMPHOCYTES # BLD AUTO: 30.5 % — SIGNIFICANT CHANGE UP (ref 13–44)
MCHC RBC-ENTMCNC: 26.8 PG — LOW (ref 27–34)
MCHC RBC-ENTMCNC: 30.9 GM/DL — LOW (ref 32–36)
MCV RBC AUTO: 86.7 FL — SIGNIFICANT CHANGE UP (ref 80–100)
MONOCYTES # BLD AUTO: 0.49 K/UL — SIGNIFICANT CHANGE UP (ref 0–0.9)
MONOCYTES NFR BLD AUTO: 5.6 % — SIGNIFICANT CHANGE UP (ref 2–14)
NEUTROPHILS # BLD AUTO: 5.37 K/UL — SIGNIFICANT CHANGE UP (ref 1.8–7.4)
NEUTROPHILS NFR BLD AUTO: 61.8 % — SIGNIFICANT CHANGE UP (ref 43–77)
NITRITE UR-MCNC: NEGATIVE — SIGNIFICANT CHANGE UP
NRBC # BLD: 0 /100 WBCS — SIGNIFICANT CHANGE UP (ref 0–0)
PH UR: 6 — SIGNIFICANT CHANGE UP (ref 5–8)
PLATELET # BLD AUTO: 351 K/UL — SIGNIFICANT CHANGE UP (ref 150–400)
POTASSIUM SERPL-MCNC: 4 MMOL/L — SIGNIFICANT CHANGE UP (ref 3.5–5.3)
POTASSIUM SERPL-SCNC: 4 MMOL/L — SIGNIFICANT CHANGE UP (ref 3.5–5.3)
PROT SERPL-MCNC: 7.9 G/DL — SIGNIFICANT CHANGE UP (ref 6–8.3)
PROT UR-MCNC: ABNORMAL
RBC # BLD: 4.51 M/UL — SIGNIFICANT CHANGE UP (ref 3.8–5.2)
RBC # FLD: 14 % — SIGNIFICANT CHANGE UP (ref 10.3–14.5)
RBC CASTS # UR COMP ASSIST: 3 /HPF — SIGNIFICANT CHANGE UP (ref 0–4)
SODIUM SERPL-SCNC: 143 MMOL/L — SIGNIFICANT CHANGE UP (ref 135–145)
SP GR SPEC: 1.03 — HIGH (ref 1.01–1.02)
UROBILINOGEN FLD QL: NEGATIVE — SIGNIFICANT CHANGE UP
WBC # BLD: 8.69 K/UL — SIGNIFICANT CHANGE UP (ref 3.8–10.5)
WBC # FLD AUTO: 8.69 K/UL — SIGNIFICANT CHANGE UP (ref 3.8–10.5)
WBC UR QL: 2 /HPF — SIGNIFICANT CHANGE UP (ref 0–5)

## 2021-04-14 PROCEDURE — G1004: CPT

## 2021-04-14 PROCEDURE — 85025 COMPLETE CBC W/AUTO DIFF WBC: CPT

## 2021-04-14 PROCEDURE — 76830 TRANSVAGINAL US NON-OB: CPT

## 2021-04-14 PROCEDURE — 76937 US GUIDE VASCULAR ACCESS: CPT

## 2021-04-14 PROCEDURE — 83690 ASSAY OF LIPASE: CPT

## 2021-04-14 PROCEDURE — 93975 VASCULAR STUDY: CPT

## 2021-04-14 PROCEDURE — 74177 CT ABD & PELVIS W/CONTRAST: CPT

## 2021-04-14 PROCEDURE — 93975 VASCULAR STUDY: CPT | Mod: 26

## 2021-04-14 PROCEDURE — 36000 PLACE NEEDLE IN VEIN: CPT

## 2021-04-14 PROCEDURE — 76830 TRANSVAGINAL US NON-OB: CPT | Mod: 26

## 2021-04-14 PROCEDURE — 80053 COMPREHEN METABOLIC PANEL: CPT

## 2021-04-14 PROCEDURE — 81001 URINALYSIS AUTO W/SCOPE: CPT

## 2021-04-14 PROCEDURE — 76937 US GUIDE VASCULAR ACCESS: CPT | Mod: 26

## 2021-04-14 PROCEDURE — 76856 US EXAM PELVIC COMPLETE: CPT

## 2021-04-14 PROCEDURE — 99284 EMERGENCY DEPT VISIT MOD MDM: CPT | Mod: 25

## 2021-04-14 PROCEDURE — 84702 CHORIONIC GONADOTROPIN TEST: CPT

## 2021-04-14 PROCEDURE — 74177 CT ABD & PELVIS W/CONTRAST: CPT | Mod: 26,MG

## 2021-04-14 PROCEDURE — 96374 THER/PROPH/DIAG INJ IV PUSH: CPT | Mod: XU

## 2021-04-14 PROCEDURE — 99285 EMERGENCY DEPT VISIT HI MDM: CPT | Mod: 25

## 2021-04-14 PROCEDURE — 76856 US EXAM PELVIC COMPLETE: CPT | Mod: 26,59

## 2021-04-14 RX ORDER — LIDOCAINE 4 G/100G
1 CREAM TOPICAL ONCE
Refills: 0 | Status: COMPLETED | OUTPATIENT
Start: 2021-04-14 | End: 2021-04-14

## 2021-04-14 RX ORDER — ACETAMINOPHEN 500 MG
975 TABLET ORAL ONCE
Refills: 0 | Status: COMPLETED | OUTPATIENT
Start: 2021-04-14 | End: 2021-04-14

## 2021-04-14 RX ORDER — KETOROLAC TROMETHAMINE 30 MG/ML
15 SYRINGE (ML) INJECTION ONCE
Refills: 0 | Status: DISCONTINUED | OUTPATIENT
Start: 2021-04-14 | End: 2021-04-14

## 2021-04-14 RX ADMIN — Medication 975 MILLIGRAM(S): at 17:17

## 2021-04-14 RX ADMIN — Medication 975 MILLIGRAM(S): at 11:28

## 2021-04-14 RX ADMIN — LIDOCAINE 1 PATCH: 4 CREAM TOPICAL at 17:15

## 2021-04-14 RX ADMIN — Medication 15 MILLIGRAM(S): at 17:15

## 2021-04-14 NOTE — ED ADULT NURSE NOTE - OBJECTIVE STATEMENT
47 yo female complaining of back pain "the pain started yesterday at 5pm out of nowhere and every since then it hurts all the time, when I breathe and everything, I am in so much pain" Pt denies urinary complains, pain is located on lower back, on the center. Pt alerted and oriented x3, no sings of acute distress noted at this moment, vitals WNL. Pt pending on MD evaluation, will continue to monitor closely.

## 2021-04-14 NOTE — ED PROVIDER NOTE - OBJECTIVE STATEMENT
46 F with no hx presenting for right sharp nonradiating lumbar back pain starting last night. Symptoms started suddenly at rest, have been constant and worsening, severe intensity, with no worsening or alleviating factors. No medications taken. Patient denies chest pain, sob, fever, chills, headache, nausea, emesis, diarrhea, abdominal pain, hematuria/dysuria or lower extremity swelling. No vaginal bleeding or discharge. No malignancy hx, no saddle annethesia, no urinary/fecal incontinence. No trauma. 46 F with no hx presenting for right sharp nonradiating lumbar back pain starting last night. Symptoms started suddenly at rest, have been constant and worsening, severe intensity, with no worsening or alleviating factors. No medications taken. Patient denies chest pain, sob, fever, chills, headache, nausea, emesis, diarrhea, abdominal pain, hematuria/dysuria or lower extremity swelling. No vaginal bleeding or discharge. No malignancy hx, no saddle annethesia, no urinary/fecal incontinence. No trauma. No numbness/tingling/weakness. 46 F with no hx presenting for right sharp nonradiating lumbar back pain starting last night. Symptoms started suddenly at rest, have been constant and worsening, severe intensity, with no worsening or alleviating factors. No medications taken. Patient denies chest pain, sob, fever, chills, headache, nausea, emesis, diarrhea, abdominal pain, hematuria/dysuria or lower extremity swelling. No vaginal bleeding or discharge. No malignancy hx, no saddle annethesia, no urinary/fecal incontinence. No trauma. No numbness/tingling/weakness.      Attn - pt seen in OR61 - agree with above - acute onset of R lower back and abdo pain, worse with mvm.  no n/v/d, numbness or weakness.  no urinary or respiratory symptoms.  pt did not take any analgesia.  no prior episodes. no vaginal bleeding or d/c or hematuria.  no prior abdo surgery.

## 2021-04-14 NOTE — ED PROVIDER NOTE - PLAN OF CARE
46 F presenting for right lumbar back pain x 1 day. no trauma or heavy lifting. Denies red flags of back pain at this time. RLQ ttp on exam. Concern for appendicitis vs torsion vs ectopic vs UTI. Possible MSK pain. Unlikely spinal fracture at this time. Plan for labs, UA, TVUS and CT abdomen pelvis.

## 2021-04-14 NOTE — ED PROVIDER NOTE - CLINICAL SUMMARY MEDICAL DECISION MAKING FREE TEXT BOX
Attn - pt with acute R lower back and lower quadrant pain yesterday.  worse with mvm.  DDx - MSK v AP v stone v rupt cyst v torsion.  analgesia, labs, ucg, CT abdo/pelvis, US r/o torsion.

## 2021-04-14 NOTE — ED ADULT NURSE REASSESSMENT NOTE - NS ED NURSE REASSESS COMMENT FT1
Vaginal exam done by Dr. Varghese about 11:30am.  Pt tolerated well. Vaginal exam done by Dr. Varghese about 11:30am.  Pt tolerated well.  (covering break for primary nurse Charlene Forman)

## 2021-04-14 NOTE — ED PROVIDER NOTE - NSFOLLOWUPINSTRUCTIONS_ED_ALL_ED_FT
You were evaluated in the Emergency Department for back pain. You were evaluated and examined by a physician, and you had labs, CT and Ultrasound.  Based on your evaluation: lumbar back pain    There are no signs of emergency conditions requiring admission to the hospital on today's workup.  Based on the evaluation, a presumptive diagnosis was made, however, further evaluation may be required by your primary care physician or a specialist for a more definitive diagnosis.  Therefore, please follow-up as directed or return to the Emergency Department if your symptoms change or worsen.    We recommend that you:  1. See your primary care physician within the next 72 hours for follow up.  Bring a copy of your discharge paperwork (including any test results) to your doctor.  2. Please see you OBGYN in next 2-3 days to discuss CT findings.  3. Please see general surgery for the gallstones found on your CT  4. Motrin/Tylenol as needed for pain      *** Return immediately if you have worsening pain, fever, or any other new/concerning symptoms. ***

## 2021-04-14 NOTE — ED PROVIDER NOTE - PROGRESS NOTE DETAILS
Upreg negative. TVUS negative for torsion. Will get CT abdomen/pelvis at this time. Labs reviewed. Imaging reviewed. CT findings noted. Patient reassessed. Patient tolerated po and ambulated without assistance. Patient stable for discharge at this time. Patient informed of findings of visit and patient verbalizes understanding of plan for follow up with PM, surgery and obgyn. Strict return precautions given.

## 2021-04-14 NOTE — ED PROVIDER NOTE - PHYSICAL EXAMINATION
· Physical Examination: PHYSICAL EXAM:   · CONSTITUTIONAL:  Appearance: uncomfortable appearing Development: well developed.  Distress: no apparent  · Manner: appropriate for situation.  Mentation: awake, alert, oriented to person, place, time/situation  · Mood: appropriate.  Nourishment: well  · Head Shape: normal cephalic, ATRAUMATIC  · EYES: bilateral normal, no discharge, redness or evidence of any abnormality  · Nose: clear Mouth: normal mucosa  · Throat: uvula midline, no vesicles, no redness, and no oropharyngeal exudate.  · CARDIAC:  CARDIAC RHYTHM: regular  CARDIAC RATE: normal  CARDIAC PEDAL EDEMA: absent  CARDIAC JVD: non-distended bilaterally  · CARDIAC PULSES: normal bilaterally  · RESPIRATORY:  Respiratory Distress: no  Breath Sounds: normal  · Chest Exam: normal, non-tender  · Abdominal Exam: soft, nondistended, RLQ ttp. negative ohara sign. Right sided CVA ttp.   · GENITOURINARY: No discharge, lesions.  · MUSCULOSKELETAL: Spine appears normal. No point  spine ttp.   · NEUROLOGICAL: Alert and oriented, no focal deficits, no motor or sensory deficits.  · SKIN: Skin normal color for race, warm, dry and intact. No evidence of rash.  · PSYCHIATRIC: Alert and oriented to person, place, time/situation. normal mood and affect. no apparent risk to self or others. · Physical Examination: PHYSICAL EXAM:   · CONSTITUTIONAL:  Appearance: uncomfortable appearing Development: well developed.  Distress: no apparent  · Manner: appropriate for situation.  Mentation: awake, alert, oriented to person, place, time/situation  · Mood: appropriate.  Nourishment: well  · Head Shape: normal cephalic, ATRAUMATIC  · EYES: bilateral normal, no discharge, redness or evidence of any abnormality  · Nose: clear Mouth: normal mucosa  · Throat: uvula midline, no vesicles, no redness, and no oropharyngeal exudate.  · CARDIAC:  CARDIAC RHYTHM: regular  CARDIAC RATE: normal  CARDIAC PEDAL EDEMA: absent  CARDIAC JVD: non-distended bilaterally  · CARDIAC PULSES: normal bilaterally  · RESPIRATORY:  Respiratory Distress: no  Breath Sounds: normal  · Chest Exam: normal, non-tender  · Abdominal Exam: soft, nondistended, RLQ ttp. negative ohara sign. Right sided CVA ttp.   · GENITOURINARY: No discharge, lesions.  · MUSCULOSKELETAL: Spine appears normal. No point  spine ttp.   · NEUROLOGICAL: Alert and oriented, no focal deficits, no motor or sensory deficits.  · SKIN: Skin normal color for race, warm, dry and intact. No evidence of rash.  · PSYCHIATRIC: Alert and oriented to person, place, time/situation. normal mood and affect. no apparent risk to self or others.     Attn - alert, severe pain - worse with mvm, no pallor or jaundice, PERRL 3 mm, moist mm, skin - warm and dry, Lungs - clear, no w/r/r, good BS bilaterally, Cor - rr, no M, no rub, Abdo - ND, soft, tender RLQ, no HSM, +R CVAT, no guarding or rebound. Back/spine - no rashes or lesions, tender CVA on Right and R para Lumbar.  Extremities - no edema, no calf tenderness, distal pulses intact and symmetrical, Neuro - intact and non-focal · Physical Examination: PHYSICAL EXAM:   · CONSTITUTIONAL:  Appearance: uncomfortable appearing Development: well developed.  Distress: no apparent  · Manner: appropriate for situation.  Mentation: awake, alert, oriented to person, place, time/situation  · Mood: appropriate.  Nourishment: well  · Head Shape: normal cephalic, ATRAUMATIC  · EYES: bilateral normal, no discharge, redness or evidence of any abnormality  · Nose: clear Mouth: normal mucosa  · Throat: uvula midline, no vesicles, no redness, and no oropharyngeal exudate.  · CARDIAC:  CARDIAC RHYTHM: regular  CARDIAC RATE: normal  CARDIAC PEDAL EDEMA: absent  CARDIAC JVD: non-distended bilaterally  · CARDIAC PULSES: normal bilaterally  · RESPIRATORY:  Respiratory Distress: no  Breath Sounds: normal  · Chest Exam: normal, non-tender  · Abdominal Exam: soft, nondistended, RLQ ttp. negative ohara sign. Right sided CVA ttp.   · GENITOURINARY: No discharge no adnexal masses or ttp. RN SRINIVASA at bedside.   · MUSCULOSKELETAL: Spine appears normal. No point  spine ttp.   · NEUROLOGICAL: Alert and oriented, no focal deficits, no motor or sensory deficits.  · SKIN: Skin normal color for race, warm, dry and intact. No evidence of rash.  · PSYCHIATRIC: Alert and oriented to person, place, time/situation. normal mood and affect. no apparent risk to self or others.     Attn - alert, severe pain - worse with mvm, no pallor or jaundice, PERRL 3 mm, moist mm, skin - warm and dry, Lungs - clear, no w/r/r, good BS bilaterally, Cor - rr, no M, no rub, Abdo - ND, soft, tender RLQ, no HSM, +R CVAT, no guarding or rebound. Back/spine - no rashes or lesions, tender CVA on Right and R para Lumbar.  Extremities - no edema, no calf tenderness, distal pulses intact and symmetrical, Neuro - intact and non-focal

## 2021-04-14 NOTE — ED PROVIDER NOTE - PATIENT PORTAL LINK FT
You can access the FollowMyHealth Patient Portal offered by [facility] by registering at the following website: [email or web address] By joining [employer]’s FollowMyHealth portal, you will also be able to view your health information using other applications (apps) compatible with our system.

## 2021-04-14 NOTE — ED PROVIDER NOTE - NSFOLLOWUPCLINICS_GEN_ALL_ED_FT
[facility]  General Surgery  [address] [place] [address] - 3rd [place] [address]  Phone: [phone]  Fax:   Follow Up Time: 1-3 Days    [facility]  Gynceology/OB  [address] [place] [address]  Phone: [phone]  Fax:   Follow Up Time: 1-3 Days

## 2021-04-14 NOTE — ED PROVIDER NOTE - CARE PLAN
Principal Discharge DX:	Lumbar back pain  Assessment and plan of treatment:	46 F presenting for right lumbar back pain x 1 day. no trauma or heavy lifting. Denies red flags of back pain at this time. RLQ ttp on exam. Concern for appendicitis vs torsion vs ectopic vs UTI. Possible MSK pain. Unlikely spinal fracture at this time. Plan for labs, UA, TVUS and CT abdomen pelvis.

## 2021-04-16 DIAGNOSIS — N64.89 OTHER SPECIFIED DISORDERS OF BREAST: ICD-10-CM

## 2021-04-17 ENCOUNTER — RESULT REVIEW (OUTPATIENT)
Age: 46
End: 2021-04-17

## 2021-04-19 ENCOUNTER — APPOINTMENT (OUTPATIENT)
Dept: SURGERY | Facility: CLINIC | Age: 46
End: 2021-04-19
Payer: COMMERCIAL

## 2021-04-19 VITALS
BODY MASS INDEX: 34.96 KG/M2 | TEMPERATURE: 98.1 F | HEART RATE: 79 BPM | HEIGHT: 62 IN | WEIGHT: 190 LBS | SYSTOLIC BLOOD PRESSURE: 112 MMHG | DIASTOLIC BLOOD PRESSURE: 67 MMHG

## 2021-04-19 DIAGNOSIS — K80.20 CALCULUS OF GALLBLADDER W/OUT CHOLECYSTITIS W/OUT OBSTRUCTION: ICD-10-CM

## 2021-04-19 PROCEDURE — 99243 OFF/OP CNSLTJ NEW/EST LOW 30: CPT

## 2021-04-19 PROCEDURE — 99072 ADDL SUPL MATRL&STAF TM PHE: CPT

## 2021-04-22 ENCOUNTER — OUTPATIENT (OUTPATIENT)
Dept: OUTPATIENT SERVICES | Facility: HOSPITAL | Age: 46
LOS: 1 days | Discharge: HOME | End: 2021-04-22

## 2021-04-22 VITALS
WEIGHT: 203.27 LBS | TEMPERATURE: 98 F | OXYGEN SATURATION: 100 % | SYSTOLIC BLOOD PRESSURE: 118 MMHG | RESPIRATION RATE: 17 BRPM | DIASTOLIC BLOOD PRESSURE: 81 MMHG | HEART RATE: 78 BPM | HEIGHT: 62 IN

## 2021-04-22 DIAGNOSIS — Z98.890 OTHER SPECIFIED POSTPROCEDURAL STATES: Chronic | ICD-10-CM

## 2021-04-22 DIAGNOSIS — K80.20 CALCULUS OF GALLBLADDER WITHOUT CHOLECYSTITIS WITHOUT OBSTRUCTION: ICD-10-CM

## 2021-04-22 DIAGNOSIS — O03.9 COMPLETE OR UNSPECIFIED SPONTANEOUS ABORTION WITHOUT COMPLICATION: Chronic | ICD-10-CM

## 2021-04-22 DIAGNOSIS — Z01.818 ENCOUNTER FOR OTHER PREPROCEDURAL EXAMINATION: ICD-10-CM

## 2021-04-22 LAB
ALBUMIN SERPL ELPH-MCNC: 3.4 G/DL — SIGNIFICANT CHANGE UP (ref 3.3–5)
ALP SERPL-CCNC: 58 U/L — SIGNIFICANT CHANGE UP (ref 40–120)
ALT FLD-CCNC: 18 U/L — SIGNIFICANT CHANGE UP (ref 12–78)
ANION GAP SERPL CALC-SCNC: 5 MMOL/L — SIGNIFICANT CHANGE UP (ref 5–17)
AST SERPL-CCNC: 14 U/L — LOW (ref 15–37)
BILIRUB SERPL-MCNC: 0.4 MG/DL — SIGNIFICANT CHANGE UP (ref 0.2–1.2)
BLD GP AB SCN SERPL QL: SIGNIFICANT CHANGE UP
BUN SERPL-MCNC: 11 MG/DL — SIGNIFICANT CHANGE UP (ref 7–23)
CALCIUM SERPL-MCNC: 8.7 MG/DL — SIGNIFICANT CHANGE UP (ref 8.5–10.1)
CHLORIDE SERPL-SCNC: 108 MMOL/L — SIGNIFICANT CHANGE UP (ref 96–108)
CO2 SERPL-SCNC: 29 MMOL/L — SIGNIFICANT CHANGE UP (ref 22–31)
CREAT SERPL-MCNC: 0.74 MG/DL — SIGNIFICANT CHANGE UP (ref 0.5–1.3)
GLUCOSE SERPL-MCNC: 61 MG/DL — LOW (ref 70–99)
HCG UR QL: NEGATIVE — SIGNIFICANT CHANGE UP
HCT VFR BLD CALC: 37.3 % — SIGNIFICANT CHANGE UP (ref 34.5–45)
HGB BLD-MCNC: 11.9 G/DL — SIGNIFICANT CHANGE UP (ref 11.5–15.5)
MCHC RBC-ENTMCNC: 26.9 PG — LOW (ref 27–34)
MCHC RBC-ENTMCNC: 31.9 GM/DL — LOW (ref 32–36)
MCV RBC AUTO: 84.2 FL — SIGNIFICANT CHANGE UP (ref 80–100)
NRBC # BLD: 0 /100 WBCS — SIGNIFICANT CHANGE UP (ref 0–0)
PLATELET # BLD AUTO: 366 K/UL — SIGNIFICANT CHANGE UP (ref 150–400)
POTASSIUM SERPL-MCNC: 3.6 MMOL/L — SIGNIFICANT CHANGE UP (ref 3.5–5.3)
POTASSIUM SERPL-SCNC: 3.6 MMOL/L — SIGNIFICANT CHANGE UP (ref 3.5–5.3)
PROT SERPL-MCNC: 8.4 GM/DL — HIGH (ref 6–8.3)
RBC # BLD: 4.43 M/UL — SIGNIFICANT CHANGE UP (ref 3.8–5.2)
RBC # FLD: 13.5 % — SIGNIFICANT CHANGE UP (ref 10.3–14.5)
SODIUM SERPL-SCNC: 142 MMOL/L — SIGNIFICANT CHANGE UP (ref 135–145)
WBC # BLD: 6.3 K/UL — SIGNIFICANT CHANGE UP (ref 3.8–10.5)
WBC # FLD AUTO: 6.3 K/UL — SIGNIFICANT CHANGE UP (ref 3.8–10.5)

## 2021-04-22 RX ORDER — IBUPROFEN 200 MG
1 TABLET ORAL
Qty: 0 | Refills: 0 | DISCHARGE

## 2021-04-22 RX ORDER — SODIUM CHLORIDE 9 MG/ML
3 INJECTION INTRAMUSCULAR; INTRAVENOUS; SUBCUTANEOUS EVERY 8 HOURS
Refills: 0 | Status: DISCONTINUED | OUTPATIENT
Start: 2021-04-29 | End: 2021-05-13

## 2021-04-22 NOTE — H&P PST ADULT - ASSESSMENT
46 year old female with a past medical history of vertigo reports severe back pain on 2021.  She went to [facility] via ambulance where a comprehensive workup revealed multiple gallstones.  She is scheduled for a laparoscopic cholecystectomy on 2021.    CAPRINI SCORE [CLOT]    AGE RELATED RISK FACTORS                                                       MOBILITY RELATED FACTORS  [x ] Age 41-60 years                                            (1 Point)                  [ ] Bed rest                                                        (1 Point)  [ ] Age: 61-74 years                                           (2 Points)                 [ ] Plaster cast                                                   (2 Points)  [ ] Age= 75 years                                              (3 Points)                 [ ] Bed bound for more than 72 hours                 (2 Points)    DISEASE RELATED RISK FACTORS                                               GENDER SPECIFIC FACTORS  [ ] Edema in the lower extremities                       (1 Point)                  [ ] Pregnancy                                                     (1 Point)  [ ] Varicose veins                                               (1 Point)                  [ ] Post-partum < 6 weeks                                   (1 Point)             [ x] BMI > 25 Kg/m2                                            (1 Point)                  [ ] Hormonal therapy  or oral contraception          (1 Point)                 [ ] Sepsis (in the previous month)                        (1 Point)                  [ ] History of pregnancy complications                 (1 point)  [ ] Pneumonia or serious lung disease                                               [ ] Unexplained or recurrent                     (1 Point)           (in the previous month)                               (1 Point)  [ ] Abnormal pulmonary function test                     (1 Point)                 SURGERY RELATED RISK FACTORS  [ ] Acute myocardial infarction                              (1 Point)                 [ ]  Section                                             (1 Point)  [ ] Congestive heart failure (in the previous month)  (1 Point)               [ ] Minor surgery                                                  (1 Point)   [ ] Inflammatory bowel disease                             (1 Point)                 [ ] Arthroscopic surgery                                        (2 Points)  [ ] Central venous access                                      (2 Points)                [ x] General surgery lasting more than 45 minutes   (2 Points)       [ ] Stroke (in the previous month)                          (5 Points)               [ ] Elective arthroplasty                                         (5 Points)                                                                                                                                               HEMATOLOGY RELATED FACTORS                                                 TRAUMA RELATED RISK FACTORS  [ ] Prior episodes of VTE                                     (3 Points)                [ ] Fracture of the hip, pelvis, or leg                       (5 Points)  [ ] Positive family history for VTE                         (3 Points)                 [ ] Acute spinal cord injury (in the previous month)  (5 Points)  [ ] Prothrombin 90437 A                                     (3 Points)                 [ ] Paralysis  (less than 1 month)                             (5 Points)  [ ] Factor V Leiden                                             (3 Points)                  [ ] Multiple Trauma within 1 month                        (5 Points)  [ ] Lupus anticoagulants                                     (3 Points)                                                           [ ] Anticardiolipin antibodies                               (3 Points)                                                       [ ] High homocysteine in the blood                      (3 Points)                                             [ ] Other congenital or acquired thrombophilia      (3 Points)                                                [ ] Heparin induced thrombocytopenia                  (3 Points)                                          Total Score [   4       ]    Caprini Score 0 - 2:  Low Risk, No VTE Prophylaxis required for most patients, encourage ambulation  Caprini Score 3 - 6:  At Risk, pharmacologic VTE prophylaxis is indicated for most patients (in the absence of a contraindication)  Caprini Score Greater than or = 7:  High Risk, pharmacologic VTE prophylaxis is indicated for most patients (in the absence of a contraindication)

## 2021-04-22 NOTE — H&P PST ADULT - NSICDXPROBLEM_GEN_ALL_CORE_FT
PROBLEM DIAGNOSES  Problem: Calculus of gallbladder without cholecystitis without obstruction  Assessment and Plan: Laparoscopic cholecystectomy, possible open

## 2021-04-22 NOTE — H&P PST ADULT - HISTORY OF PRESENT ILLNESS
46 year old female with a past medical history of vertigo reports severe back pain on 4/14/2021.  She went to [facility] via ambulance where a comprehensive workup revealed multiple gallstones.  She is scheduled for a laparoscopic cholecystectomy on 4/29/2021.    She denies fever, cough, SOB, recent travels, and sick contacts.

## 2021-04-25 DIAGNOSIS — Z01.818 ENCOUNTER FOR OTHER PREPROCEDURAL EXAMINATION: ICD-10-CM

## 2021-04-26 ENCOUNTER — APPOINTMENT (OUTPATIENT)
Dept: DISASTER EMERGENCY | Facility: CLINIC | Age: 46
End: 2021-04-26

## 2021-04-27 LAB — SARS-COV-2 N GENE NPH QL NAA+PROBE: NOT DETECTED

## 2021-04-28 ENCOUNTER — TRANSCRIPTION ENCOUNTER (OUTPATIENT)
Age: 46
End: 2021-04-28

## 2021-04-29 ENCOUNTER — OUTPATIENT (OUTPATIENT)
Dept: OUTPATIENT SERVICES | Facility: HOSPITAL | Age: 46
LOS: 1 days | Discharge: HOME | End: 2021-04-29
Payer: COMMERCIAL

## 2021-04-29 ENCOUNTER — RESULT REVIEW (OUTPATIENT)
Age: 46
End: 2021-04-29

## 2021-04-29 ENCOUNTER — APPOINTMENT (OUTPATIENT)
Dept: SURGERY | Facility: HOSPITAL | Age: 46
End: 2021-04-29

## 2021-04-29 VITALS
HEART RATE: 80 BPM | RESPIRATION RATE: 17 BRPM | DIASTOLIC BLOOD PRESSURE: 87 MMHG | OXYGEN SATURATION: 99 % | HEIGHT: 62 IN | SYSTOLIC BLOOD PRESSURE: 134 MMHG | WEIGHT: 190.04 LBS | TEMPERATURE: 98 F

## 2021-04-29 VITALS
OXYGEN SATURATION: 97 % | DIASTOLIC BLOOD PRESSURE: 70 MMHG | HEART RATE: 84 BPM | SYSTOLIC BLOOD PRESSURE: 115 MMHG | RESPIRATION RATE: 12 BRPM

## 2021-04-29 DIAGNOSIS — O03.9 COMPLETE OR UNSPECIFIED SPONTANEOUS ABORTION WITHOUT COMPLICATION: Chronic | ICD-10-CM

## 2021-04-29 DIAGNOSIS — Z98.890 OTHER SPECIFIED POSTPROCEDURAL STATES: Chronic | ICD-10-CM

## 2021-04-29 LAB
GLUCOSE BLDC GLUCOMTR-MCNC: 96 MG/DL — SIGNIFICANT CHANGE UP (ref 70–99)
HCG UR QL: NEGATIVE — SIGNIFICANT CHANGE UP

## 2021-04-29 PROCEDURE — 47562 LAPAROSCOPIC CHOLECYSTECTOMY: CPT

## 2021-04-29 PROCEDURE — 49585: CPT | Mod: AS,59

## 2021-04-29 PROCEDURE — 49585: CPT | Mod: 59

## 2021-04-29 PROCEDURE — 47562 LAPAROSCOPIC CHOLECYSTECTOMY: CPT | Mod: AS

## 2021-04-29 PROCEDURE — 88304 TISSUE EXAM BY PATHOLOGIST: CPT | Mod: 26

## 2021-04-29 RX ORDER — ONDANSETRON 8 MG/1
4 TABLET, FILM COATED ORAL ONCE
Refills: 0 | Status: DISCONTINUED | OUTPATIENT
Start: 2021-04-29 | End: 2021-04-29

## 2021-04-29 RX ORDER — SODIUM CHLORIDE 9 MG/ML
1000 INJECTION, SOLUTION INTRAVENOUS
Refills: 0 | Status: DISCONTINUED | OUTPATIENT
Start: 2021-04-29 | End: 2021-04-29

## 2021-04-29 RX ORDER — FENTANYL CITRATE 50 UG/ML
25 INJECTION INTRAVENOUS
Refills: 0 | Status: DISCONTINUED | OUTPATIENT
Start: 2021-04-29 | End: 2021-04-29

## 2021-04-29 RX ORDER — FENTANYL CITRATE 50 UG/ML
50 INJECTION INTRAVENOUS
Refills: 0 | Status: DISCONTINUED | OUTPATIENT
Start: 2021-04-29 | End: 2021-04-29

## 2021-04-29 RX ADMIN — FENTANYL CITRATE 50 MICROGRAM(S): 50 INJECTION INTRAVENOUS at 12:40

## 2021-04-29 RX ADMIN — FENTANYL CITRATE 50 MICROGRAM(S): 50 INJECTION INTRAVENOUS at 11:54

## 2021-04-29 RX ADMIN — SODIUM CHLORIDE 75 MILLILITER(S): 9 INJECTION, SOLUTION INTRAVENOUS at 10:53

## 2021-04-29 NOTE — BRIEF OPERATIVE NOTE - NSICDXBRIEFPROCEDURE_GEN_ALL_CORE_FT
PROCEDURES:  Laparoscopic cholecystec 29-Apr-2021 09:44:42  Jeff Donald  Repair, hernia, umbilical, adult 29-Apr-2021 09:44:58  Jeff Donald

## 2021-04-29 NOTE — BRIEF OPERATIVE NOTE - NSICDXBRIEFPREOP_GEN_ALL_CORE_FT
PRE-OP DIAGNOSIS:  Cholelithiasis 29-Apr-2021 09:45:09  Jeff Donald  Umbilical hernia 29-Apr-2021 09:45:20  Jeff Donald

## 2021-04-29 NOTE — ASU DISCHARGE PLAN (ADULT/PEDIATRIC) - CALL YOUR DOCTOR IF YOU HAVE ANY OF THE FOLLOWING:
Bleeding that does not stop/Swelling that gets worse/Fever greater than (need to indicate Fahrenheit or Celsius)/Wound/Surgical Site with redness, or foul smelling discharge or pus/Unable to urinate

## 2021-04-29 NOTE — BRIEF OPERATIVE NOTE - NSICDXBRIEFPOSTOP_GEN_ALL_CORE_FT
POST-OP DIAGNOSIS:  Umbilical hernia 29-Apr-2021 09:45:29  Jeff Donald  Cholelithiasis 29-Apr-2021 09:45:26  Jeff Donald

## 2021-04-29 NOTE — ASU DISCHARGE PLAN (ADULT/PEDIATRIC) - NURSING INSTRUCTIONS
Rest today, Follow up with Dr. Winters as planned. Frequent hand washing advised to prevent infection.

## 2021-04-30 ENCOUNTER — TRANSCRIPTION ENCOUNTER (OUTPATIENT)
Age: 46
End: 2021-04-30

## 2021-04-30 PROBLEM — Z87.898 PERSONAL HISTORY OF OTHER SPECIFIED CONDITIONS: Chronic | Status: ACTIVE | Noted: 2021-04-22

## 2021-04-30 LAB — SURGICAL PATHOLOGY STUDY: SIGNIFICANT CHANGE UP

## 2021-05-06 ENCOUNTER — APPOINTMENT (OUTPATIENT)
Dept: ULTRASOUND IMAGING | Facility: CLINIC | Age: 46
End: 2021-05-06

## 2021-05-06 ENCOUNTER — APPOINTMENT (OUTPATIENT)
Dept: MAMMOGRAPHY | Facility: CLINIC | Age: 46
End: 2021-05-06
Payer: COMMERCIAL

## 2021-05-06 ENCOUNTER — RESULT REVIEW (OUTPATIENT)
Age: 46
End: 2021-05-06

## 2021-05-06 ENCOUNTER — OUTPATIENT (OUTPATIENT)
Dept: OUTPATIENT SERVICES | Facility: HOSPITAL | Age: 46
LOS: 1 days | End: 2021-05-06
Payer: COMMERCIAL

## 2021-05-06 DIAGNOSIS — O03.9 COMPLETE OR UNSPECIFIED SPONTANEOUS ABORTION WITHOUT COMPLICATION: Chronic | ICD-10-CM

## 2021-05-06 DIAGNOSIS — Z98.890 OTHER SPECIFIED POSTPROCEDURAL STATES: Chronic | ICD-10-CM

## 2021-05-06 DIAGNOSIS — Z00.8 ENCOUNTER FOR OTHER GENERAL EXAMINATION: ICD-10-CM

## 2021-05-06 PROCEDURE — 76641 ULTRASOUND BREAST COMPLETE: CPT | Mod: 26,50

## 2021-05-06 PROCEDURE — G0279: CPT

## 2021-05-06 PROCEDURE — G0279: CPT | Mod: 26

## 2021-05-06 PROCEDURE — 77066 DX MAMMO INCL CAD BI: CPT | Mod: 26

## 2021-05-06 PROCEDURE — 77066 DX MAMMO INCL CAD BI: CPT

## 2021-05-06 PROCEDURE — 76641 ULTRASOUND BREAST COMPLETE: CPT

## 2021-05-10 ENCOUNTER — APPOINTMENT (OUTPATIENT)
Dept: SURGERY | Facility: CLINIC | Age: 46
End: 2021-05-10
Payer: COMMERCIAL

## 2021-05-10 VITALS
HEART RATE: 79 BPM | TEMPERATURE: 97.7 F | BODY MASS INDEX: 34.96 KG/M2 | HEIGHT: 62 IN | WEIGHT: 190 LBS | DIASTOLIC BLOOD PRESSURE: 84 MMHG | SYSTOLIC BLOOD PRESSURE: 121 MMHG

## 2021-05-10 DIAGNOSIS — K80.10 CALCULUS OF GALLBLADDER WITH CHRONIC CHOLECYSTITIS WITHOUT OBSTRUCTION: ICD-10-CM

## 2021-05-10 DIAGNOSIS — K42.0 UMBILICAL HERNIA WITH OBSTRUCTION, WITHOUT GANGRENE: ICD-10-CM

## 2021-05-10 PROCEDURE — 99024 POSTOP FOLLOW-UP VISIT: CPT

## 2021-05-11 ENCOUNTER — NON-APPOINTMENT (OUTPATIENT)
Age: 46
End: 2021-05-11

## 2021-05-17 ENCOUNTER — OUTPATIENT (OUTPATIENT)
Dept: OUTPATIENT SERVICES | Facility: HOSPITAL | Age: 46
LOS: 1 days | End: 2021-05-17

## 2021-05-17 DIAGNOSIS — Z98.890 OTHER SPECIFIED POSTPROCEDURAL STATES: Chronic | ICD-10-CM

## 2021-05-17 DIAGNOSIS — Z00.8 ENCOUNTER FOR OTHER GENERAL EXAMINATION: ICD-10-CM

## 2021-05-17 DIAGNOSIS — O03.9 COMPLETE OR UNSPECIFIED SPONTANEOUS ABORTION WITHOUT COMPLICATION: Chronic | ICD-10-CM

## 2021-05-25 ENCOUNTER — RESULT REVIEW (OUTPATIENT)
Age: 46
End: 2021-05-25

## 2021-05-25 ENCOUNTER — APPOINTMENT (OUTPATIENT)
Dept: ULTRASOUND IMAGING | Facility: CLINIC | Age: 46
End: 2021-05-25
Payer: COMMERCIAL

## 2021-05-25 ENCOUNTER — APPOINTMENT (OUTPATIENT)
Dept: ULTRASOUND IMAGING | Facility: CLINIC | Age: 46
End: 2021-05-25

## 2021-05-25 ENCOUNTER — OUTPATIENT (OUTPATIENT)
Dept: OUTPATIENT SERVICES | Facility: HOSPITAL | Age: 46
LOS: 1 days | End: 2021-05-25
Payer: COMMERCIAL

## 2021-05-25 DIAGNOSIS — Z00.8 ENCOUNTER FOR OTHER GENERAL EXAMINATION: ICD-10-CM

## 2021-05-25 DIAGNOSIS — O03.9 COMPLETE OR UNSPECIFIED SPONTANEOUS ABORTION WITHOUT COMPLICATION: Chronic | ICD-10-CM

## 2021-05-25 DIAGNOSIS — Z98.890 OTHER SPECIFIED POSTPROCEDURAL STATES: Chronic | ICD-10-CM

## 2021-05-25 PROCEDURE — A4648: CPT

## 2021-05-25 PROCEDURE — 77066 DX MAMMO INCL CAD BI: CPT | Mod: 26

## 2021-05-25 PROCEDURE — 19083 BX BREAST 1ST LESION US IMAG: CPT | Mod: LT

## 2021-05-25 PROCEDURE — 88305 TISSUE EXAM BY PATHOLOGIST: CPT

## 2021-05-25 PROCEDURE — 77066 DX MAMMO INCL CAD BI: CPT

## 2021-05-25 PROCEDURE — 19083 BX BREAST 1ST LESION US IMAG: CPT

## 2021-05-25 PROCEDURE — 88305 TISSUE EXAM BY PATHOLOGIST: CPT | Mod: 26

## 2021-05-25 PROCEDURE — 19084 BX BREAST ADD LESION US IMAG: CPT | Mod: RT

## 2021-05-27 LAB — SURGICAL PATHOLOGY STUDY: SIGNIFICANT CHANGE UP

## 2021-05-28 ENCOUNTER — OUTPATIENT (OUTPATIENT)
Dept: OUTPATIENT SERVICES | Facility: HOSPITAL | Age: 46
LOS: 1 days | End: 2021-05-28

## 2021-05-28 VITALS
SYSTOLIC BLOOD PRESSURE: 130 MMHG | OXYGEN SATURATION: 99 % | HEART RATE: 87 BPM | WEIGHT: 203.93 LBS | RESPIRATION RATE: 20 BRPM | HEIGHT: 63 IN | DIASTOLIC BLOOD PRESSURE: 84 MMHG | TEMPERATURE: 98 F

## 2021-05-28 DIAGNOSIS — Z90.49 ACQUIRED ABSENCE OF OTHER SPECIFIED PARTS OF DIGESTIVE TRACT: Chronic | ICD-10-CM

## 2021-05-28 DIAGNOSIS — Z98.890 OTHER SPECIFIED POSTPROCEDURAL STATES: Chronic | ICD-10-CM

## 2021-05-28 DIAGNOSIS — O03.9 COMPLETE OR UNSPECIFIED SPONTANEOUS ABORTION WITHOUT COMPLICATION: Chronic | ICD-10-CM

## 2021-05-28 DIAGNOSIS — N84.0 POLYP OF CORPUS UTERI: ICD-10-CM

## 2021-05-28 LAB
ANION GAP SERPL CALC-SCNC: 13 MMOL/L — SIGNIFICANT CHANGE UP (ref 7–14)
BUN SERPL-MCNC: 15 MG/DL — SIGNIFICANT CHANGE UP (ref 7–23)
CALCIUM SERPL-MCNC: 9.1 MG/DL — SIGNIFICANT CHANGE UP (ref 8.4–10.5)
CHLORIDE SERPL-SCNC: 104 MMOL/L — SIGNIFICANT CHANGE UP (ref 98–107)
CO2 SERPL-SCNC: 22 MMOL/L — SIGNIFICANT CHANGE UP (ref 22–31)
CREAT SERPL-MCNC: 0.79 MG/DL — SIGNIFICANT CHANGE UP (ref 0.5–1.3)
GLUCOSE SERPL-MCNC: 89 MG/DL — SIGNIFICANT CHANGE UP (ref 70–99)
HCG UR QL: NEGATIVE — SIGNIFICANT CHANGE UP
HCT VFR BLD CALC: 36.2 % — SIGNIFICANT CHANGE UP (ref 34.5–45)
HGB BLD-MCNC: 11.6 G/DL — SIGNIFICANT CHANGE UP (ref 11.5–15.5)
MCHC RBC-ENTMCNC: 27 PG — SIGNIFICANT CHANGE UP (ref 27–34)
MCHC RBC-ENTMCNC: 32 GM/DL — SIGNIFICANT CHANGE UP (ref 32–36)
MCV RBC AUTO: 84.4 FL — SIGNIFICANT CHANGE UP (ref 80–100)
NRBC # BLD: 0 /100 WBCS — SIGNIFICANT CHANGE UP
NRBC # FLD: 0 K/UL — SIGNIFICANT CHANGE UP
PLATELET # BLD AUTO: 393 K/UL — SIGNIFICANT CHANGE UP (ref 150–400)
POTASSIUM SERPL-MCNC: 3.5 MMOL/L — SIGNIFICANT CHANGE UP (ref 3.5–5.3)
POTASSIUM SERPL-SCNC: 3.5 MMOL/L — SIGNIFICANT CHANGE UP (ref 3.5–5.3)
RBC # BLD: 4.29 M/UL — SIGNIFICANT CHANGE UP (ref 3.8–5.2)
RBC # FLD: 14.5 % — SIGNIFICANT CHANGE UP (ref 10.3–14.5)
SODIUM SERPL-SCNC: 139 MMOL/L — SIGNIFICANT CHANGE UP (ref 135–145)
WBC # BLD: 7.69 K/UL — SIGNIFICANT CHANGE UP (ref 3.8–10.5)
WBC # FLD AUTO: 7.69 K/UL — SIGNIFICANT CHANGE UP (ref 3.8–10.5)

## 2021-05-28 RX ORDER — SODIUM CHLORIDE 9 MG/ML
1000 INJECTION, SOLUTION INTRAVENOUS
Refills: 0 | Status: DISCONTINUED | OUTPATIENT
Start: 2021-06-08 | End: 2021-06-22

## 2021-05-28 NOTE — H&P PST ADULT - NSICDXPROBLEM_GEN_ALL_CORE_FT
PROBLEM DIAGNOSES  Problem: Polyp of corpus uteri  Assessment and Plan: Dilation curettage operative hysteroscopy with myosure   Preop instructions provided and patient verbalizes understanding.  Labs done and results pending.   Famotidine provided with instructions.

## 2021-05-28 NOTE — H&P PST ADULT - NSICDXPASTMEDICALHX_GEN_ALL_CORE_FT
PAST MEDICAL HISTORY:  H/O vertigo     Uterine fibroid     Uterine polyp      PAST MEDICAL HISTORY:  H/O vertigo     Need for dental care 5/28/21 , had dental work today placed on amoxicillin for 5 days    Uterine fibroid     Uterine polyp

## 2021-05-28 NOTE — H&P PST ADULT - SKIN/BREAST COMMENTS
recent laparoscopic cholecystectomy with hernia repair - dressing removed , site without swelling or redness.

## 2021-05-28 NOTE — H&P PST ADULT - NSICDXPASTSURGICALHX_GEN_ALL_CORE_FT
PAST SURGICAL HISTORY:       H/O breast biopsy bilateral 21 radiology    History of D&C     S/P cholecystectomy with hernia repair 21 - [facility]

## 2021-05-28 NOTE — H&P PST ADULT - HISTORY OF PRESENT ILLNESS
This is a 46 y.o. female LMP 21 . Pt had exam , PAP done , sono done . Pt has polyp of corpus uteri . Pt now for surgery .

## 2021-05-28 NOTE — H&P PST ADULT - NSICDXFAMILYHX_GEN_ALL_CORE_FT
FAMILY HISTORY:  Mother  Still living? Yes, Estimated age: Age Unknown  Family history of hypertension, Age at diagnosis: Age Unknown

## 2021-06-05 ENCOUNTER — LABORATORY RESULT (OUTPATIENT)
Age: 46
End: 2021-06-05

## 2021-06-05 ENCOUNTER — APPOINTMENT (OUTPATIENT)
Dept: DISASTER EMERGENCY | Facility: CLINIC | Age: 46
End: 2021-06-05

## 2021-06-07 ENCOUNTER — TRANSCRIPTION ENCOUNTER (OUTPATIENT)
Age: 46
End: 2021-06-07

## 2021-06-07 NOTE — ASU PATIENT PROFILE, ADULT - NSALCOHOLFREQ_GEN_A_CORE_SD
Health Maintenance Due   Topic Date Due   • Influenza Vaccine (1) 09/01/2018       Patient is due for the topics as listed above and wishes to proceed with them.        monthly or less

## 2021-06-07 NOTE — ASU PATIENT PROFILE, ADULT - PATIENT REPRESENTATIVE NAME
"  5/9/2018      RE: Crystal Christopher  [facility] [facility]  [place] [address]       Crystal is a 11 year old girl who was seen in follow-up in Pediatric Rheumatology clinic today.    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    She is currently taking the following medications and the doses as documented.          Medications:     Current Outpatient Prescriptions   Medication Sig Dispense Refill     CELLCEPT (BRAND) 500 MG TABLET Take 1 tablet (500 mg) by mouth daily 30 tablet 5     Famotidine (PEPCID PO)        Pediatric Multiple Vit-C-FA (MULTIVITAMIN CHILDRENS PO)        tacrolimus (PROTOPIC) 0.1 % ointment Apply topically 2 times daily       triamcinolone (KENALOG) 0.1 % cream Apply topically 2 times daily       VITAMIN D, CHOLECALCIFEROL, PO Take 1,000 Units by mouth daily         Crystal is tolerating the medication(s) well.          Interval History:     Crystal returns for scheduled follow-up accompanied by her parents and 3 siblings. I last saw her 3 months ago.  She has tapered down on her Cellcept from 500 mg twice daily, to 500AM + 250 PM, then went to 500 mg once daily 1.5 weeks ago.  She has done well with this taper.  She did have some unintended sun exposure a few days ago, and now has some rash on her upper arms and some tiny papules on her fingers.    Her muscle strength and endurance are normal.  She bikes and runs while playing and is \"nonstop\".  She is looking forward to [employer] camp, \"Kids camp\", and a family reunion in [place] this summer.    She is home schooled, and is in the midst of 4th grade.         Review of Systems:     A comprehensive review of systems was performed and was negative apart from that listed above.    I reviewed the growth chart and she is growing nicely along her percentile lines.       Examination:     Blood pressure 98/60, pulse 75, temperature 98.2  F (36.8  C), temperature source Oral, height 4' 1.76\" (126.4 cm), weight 51 lb 12.9 oz (23.5 kg).     5 " %ile based on CDC 2-20 Years weight-for-age data using vitals from 5/9/2018.    Blood pressure percentiles are 49.0 % systolic and 54.6 % diastolic based on NHBPEP's 4th Report.     In general Crystal was well appearing and in good spirits.   HEENT:  Pupils were equal, round and reactive to light.  Nose normal.  Oropharynx moist and pink with no intraoral lesions.  NECK:  Supple, no lymphadenopathy.  CHEST:  Clear to auscultation.  HEART:  Regular rate and rhythm.  No murmur.  ABDOMEN:  Soft, non-tender, no hepatosplenomegaly.  JOINTS:  Normal.  SKIN:  She has faint erythema on her upper arms.  She has slightly erythematous hands with two tiny papules near the MCPs, one on each hand. Nailfold capillaries were normal.  MUSCLES: 5/5 strength throughout.  No Carlos sign.       Laboratory Investigations:     Office Visit on 05/09/2018   Component Date Value Ref Range Status     Aldolase 05/09/2018 6.0  3.3 - 9.7 U/L Final    Comment: (Note)  This specimen is Hemolyzed. This may cause the results to   be falsely increased.  REFERENCE INTERVAL: Aldolase  Access complete set of age- and/or gender-specific   reference intervals for this test in the [facility]   Test Directory ([email or web address]).  Performed by [facility],  [address] [place] [id number] [phone]  [email or web address] Dey MD, Lab. Director       Bilirubin Direct 05/09/2018 0.1  0.0 - 0.2 mg/dL Final     Bilirubin Total 05/09/2018 0.5  0.2 - 1.3 mg/dL Final     Albumin 05/09/2018 4.0  3.4 - 5.0 g/dL Final     Protein Total 05/09/2018 7.5  6.5 - 8.4 g/dL Final     Alkaline Phosphatase 05/09/2018 258  150 - 420 U/L Final     ALT 05/09/2018 21  0 - 50 U/L Final     AST 05/09/2018 30  0 - 50 U/L Final     CK Total 05/09/2018 119  30 - 225 U/L Final     Lactate Dehydrogenase 05/09/2018 222  0 - 337 U/L Final     von Willebrand Antigen 05/09/2018 82  50 - 200 % Final     WBC 05/09/2018 5.7  5.0 - 14.5 10e9/L Final     RBC Count 05/09/2018 4.64  3.7 -  5.3 10e12/L Final     Hemoglobin 05/09/2018 12.9  10.5 - 14.0 g/dL Final     Hematocrit 05/09/2018 40.7  31.5 - 43.0 % Final     MCV 05/09/2018 88  70 - 100 fl Final     MCH 05/09/2018 27.8  26.5 - 33.0 pg Final     MCHC 05/09/2018 31.7  31.5 - 36.5 g/dL Final     RDW 05/09/2018 13.2  10.0 - 15.0 % Final     Platelet Count 05/09/2018 269  150 - 450 10e9/L Final     Diff Method 05/09/2018 Automated Method   Final     % Neutrophils 05/09/2018 30.8  % Final     % Lymphocytes 05/09/2018 60.0  % Final     % Monocytes 05/09/2018 7.4  % Final     % Eosinophils 05/09/2018 1.4  % Final     % Basophils 05/09/2018 0.4  % Final     % Immature Granulocytes 05/09/2018 0.0  % Final     Nucleated RBCs 05/09/2018 0  0 /100 Final     Absolute Neutrophil 05/09/2018 1.8  1.3 - 8.1 10e9/L Final     Absolute Lymphocytes 05/09/2018 3.4  1.1 - 8.6 10e9/L Final     Absolute Monocytes 05/09/2018 0.4  0.0 - 1.1 10e9/L Final     Absolute Eosinophils 05/09/2018 0.1  0.0 - 0.7 10e9/L Final     Absolute Basophils 05/09/2018 0.0  0.0 - 0.2 10e9/L Final     Abs Immature Granulocytes 05/09/2018 0.0  0 - 0.4 10e9/L Final     Absolute Nucleated RBC 05/09/2018 0.0   Final              Impression:     Crystal is a 11 year old  with   1. 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  The lab values today are reassuring with no evidence of systemic inflammation or medication-related toxicity.    I am inclined to make no changes in the medication regimen.  I would like for her to have a long period of stability (3-6 months) before reducing the Cellcept dose further.         Plan:     1. Continue Cellcept 500 mg once daily.  2. I reminded her about the importance of sun protection.  3. Follow up with me in 3 months.      It is a pleasure to continue to participate in Crystal's care.  Please feel free to contact me with any questions or concerns you have regarding Crystal's care.    Peewee Wick MD,  PhD  , Pediatric Rheumatology      CC  MELISSA BANKS    Copy to patient    Parent(s) of Crystal Christopher  [address] [place] [id number]     Florence Bhat (sister)

## 2021-06-07 NOTE — ASU PATIENT PROFILE, ADULT - PMH
H/O vertigo    Need for dental care  5/28/21 , had dental work today placed on amoxicillin for 5 days  Uterine fibroid    Uterine polyp

## 2021-06-07 NOTE — ASU PATIENT PROFILE, ADULT - PSH
H/O breast biopsy  bilateral 21 radiology  History of D&C    S/P cholecystectomy  with hernia repair 21 - [facility]

## 2021-06-08 ENCOUNTER — APPOINTMENT (OUTPATIENT)
Dept: OBGYN | Facility: HOSPITAL | Age: 46
End: 2021-06-08

## 2021-06-08 ENCOUNTER — OUTPATIENT (OUTPATIENT)
Dept: OUTPATIENT SERVICES | Facility: HOSPITAL | Age: 46
LOS: 1 days | Discharge: HOME | End: 2021-06-08
Payer: COMMERCIAL

## 2021-06-08 ENCOUNTER — RESULT REVIEW (OUTPATIENT)
Age: 46
End: 2021-06-08

## 2021-06-08 VITALS
OXYGEN SATURATION: 95 % | SYSTOLIC BLOOD PRESSURE: 132 MMHG | HEIGHT: 63 IN | HEART RATE: 81 BPM | RESPIRATION RATE: 16 BRPM | WEIGHT: 203.93 LBS | TEMPERATURE: 99 F | DIASTOLIC BLOOD PRESSURE: 81 MMHG

## 2021-06-08 VITALS
HEART RATE: 82 BPM | RESPIRATION RATE: 17 BRPM | DIASTOLIC BLOOD PRESSURE: 66 MMHG | SYSTOLIC BLOOD PRESSURE: 122 MMHG | OXYGEN SATURATION: 100 %

## 2021-06-08 DIAGNOSIS — N84.0 POLYP OF CORPUS UTERI: ICD-10-CM

## 2021-06-08 DIAGNOSIS — Z98.890 OTHER SPECIFIED POSTPROCEDURAL STATES: Chronic | ICD-10-CM

## 2021-06-08 DIAGNOSIS — Z90.49 ACQUIRED ABSENCE OF OTHER SPECIFIED PARTS OF DIGESTIVE TRACT: Chronic | ICD-10-CM

## 2021-06-08 DIAGNOSIS — O03.9 COMPLETE OR UNSPECIFIED SPONTANEOUS ABORTION WITHOUT COMPLICATION: Chronic | ICD-10-CM

## 2021-06-08 LAB — HCG UR QL: NEGATIVE — SIGNIFICANT CHANGE UP

## 2021-06-08 PROCEDURE — 58562 HYSTEROSCOPY REMOVE FB: CPT

## 2021-06-08 PROCEDURE — 88305 TISSUE EXAM BY PATHOLOGIST: CPT | Mod: 26

## 2021-06-08 RX ORDER — IBUPROFEN 200 MG
1 TABLET ORAL
Qty: 0 | Refills: 0 | DISCHARGE

## 2021-06-08 RX ORDER — SODIUM CHLORIDE 9 MG/ML
1000 INJECTION, SOLUTION INTRAVENOUS
Refills: 0 | Status: DISCONTINUED | OUTPATIENT
Start: 2021-06-08 | End: 2021-06-22

## 2021-06-08 NOTE — ASU DISCHARGE PLAN (ADULT/PEDIATRIC) - ***IN THE EVENT THAT YOU DEVELOP A COMPLICATION AND YOU ARE UNABLE TO REACH YOUR OWN PHYSICIAN, YOU MAY CONTACT:
Eye Color: dark Meribeth Boris
Have You Had Laser Hair Removal Before?: has had previous treatment
When Outside In The Sun, Do You...: rarely burns, tans with ease
When Was Your Last Laser Treatment?: 5/21/2020
Number Of Treatments: 3
Statement Selected

## 2021-06-08 NOTE — ASU DISCHARGE PLAN (ADULT/PEDIATRIC) - NURSING INSTRUCTIONS
You were given intravenous TYLENOL for pain management at ____2pm___________. Please DO NOT take any products containing TYLENOL or ACETAMINOPHEN, such as VICODIN, PERCOCET, EXCEDRIN, and any over-the-counter cold medication for the next 6 hours (until _____8pm_________). DO NOT TAKE MORE THAN 3000 MG OF TYLENOL in a 24 hour period.        You were given intravenous TORADOL for pain management at ____2:30pm__________. Please DO NOT take ibuprofen containing products such as MOTRIN or ADVIL, or any other NSAIDs (Non-Steroidal Anti-Inflammatory Drugs) such as ALEVE for the next 6 hours (until _____8:30pm_________).

## 2021-06-08 NOTE — BRIEF OPERATIVE NOTE - OPERATION/FINDINGS
EUA revealed small anteverted uterus. EUA revealed small anteverted uterus. Dx hysteroscopy revealed multiple intracavitary synechiae with polypoid tissue along the right lateral uterine wall. Ostia visualized and wnl bilaterally. Excellent hemostasis at completion of procedure. Fluid deficit 500cc.

## 2021-06-08 NOTE — ASU DISCHARGE PLAN (ADULT/PEDIATRIC) - CARE PROVIDER_API CALL
Sherie Mcgregor)  Obstetrics and Gynecology  [facility] [place] [address]  Phone: [phone]  Fax: [phone]  Established Patient  Follow Up Time: 2 weeks

## 2021-06-08 NOTE — BRIEF OPERATIVE NOTE - NSICDXBRIEFPROCEDURE_GEN_ALL_CORE_FT
PROCEDURES:  Dilation and curettage, uterus, with polypectomy or myomectomy using MyoSure tissue removal system 08-Jun-2021 15:11:27  Frankel, Robyn

## 2021-06-09 ENCOUNTER — NON-APPOINTMENT (OUTPATIENT)
Age: 46
End: 2021-06-09

## 2021-06-13 LAB — SURGICAL PATHOLOGY STUDY: SIGNIFICANT CHANGE UP

## 2021-06-28 ENCOUNTER — APPOINTMENT (OUTPATIENT)
Dept: OBGYN | Facility: CLINIC | Age: 46
End: 2021-06-28
Payer: COMMERCIAL

## 2021-06-28 VITALS — SYSTOLIC BLOOD PRESSURE: 117 MMHG | DIASTOLIC BLOOD PRESSURE: 81 MMHG | HEIGHT: 62 IN

## 2021-06-28 DIAGNOSIS — Z09 ENCOUNTER FOR FOLLOW-UP EXAMINATION AFTER COMPLETED TREATMENT FOR CONDITIONS OTHER THAN MALIGNANT NEOPLASM: ICD-10-CM

## 2021-06-28 PROCEDURE — 99213 OFFICE O/P EST LOW 20 MIN: CPT

## 2021-07-19 ENCOUNTER — APPOINTMENT (OUTPATIENT)
Dept: OBGYN | Facility: CLINIC | Age: 46
End: 2021-07-19

## 2021-10-12 PROBLEM — Z91.89 OTHER SPECIFIED PERSONAL RISK FACTORS, NOT ELSEWHERE CLASSIFIED: Chronic | Status: ACTIVE | Noted: 2021-05-28

## 2021-10-21 ENCOUNTER — APPOINTMENT (OUTPATIENT)
Dept: OBGYN | Facility: CLINIC | Age: 46
End: 2021-10-21

## 2022-02-09 ENCOUNTER — NON-APPOINTMENT (OUTPATIENT)
Age: 47
End: 2022-02-09

## 2022-02-14 ENCOUNTER — NON-APPOINTMENT (OUTPATIENT)
Age: 47
End: 2022-02-14

## 2022-02-22 ENCOUNTER — ASOB RESULT (OUTPATIENT)
Age: 47
End: 2022-02-22

## 2022-02-22 ENCOUNTER — APPOINTMENT (OUTPATIENT)
Dept: OBGYN | Facility: CLINIC | Age: 47
End: 2022-02-22
Payer: COMMERCIAL

## 2022-02-22 PROCEDURE — 76830 TRANSVAGINAL US NON-OB: CPT

## 2022-03-08 NOTE — ASU PATIENT PROFILE, ADULT - FALLEN IN THE PAST
----- Message from Kina Breaux MD sent at 3/6/2022  2:37 PM CST -----  Please tell patient his cervical MRI is stable showing no new or enhancing lesions, ok to continue Copaxone.  He does have some degenerative spine changes but they are not severe.  We will keep an eye on these with future MRIs.  Continue current plan.  Thanks, LR   no

## 2022-03-11 ENCOUNTER — APPOINTMENT (OUTPATIENT)
Dept: OBGYN | Facility: CLINIC | Age: 47
End: 2022-03-11
Payer: COMMERCIAL

## 2022-03-11 VITALS
BODY MASS INDEX: 37.54 KG/M2 | HEIGHT: 62 IN | WEIGHT: 204 LBS | SYSTOLIC BLOOD PRESSURE: 127 MMHG | DIASTOLIC BLOOD PRESSURE: 86 MMHG

## 2022-03-11 PROCEDURE — 36415 COLL VENOUS BLD VENIPUNCTURE: CPT

## 2022-03-11 PROCEDURE — 99214 OFFICE O/P EST MOD 30 MIN: CPT

## 2022-03-11 NOTE — DISCUSSION/SUMMARY
[FreeTextEntry1] : fibroids are larger  - uterus 2cm larger than last year \par 15cm \par again reviewed options incluidng birth control, IUD, UAE, hysterectomy\par Pt reluctant to have hysterectomy and prefers myomectomy - not sure if completed childbearing - reviewed risk based on her age\par woud recommend hysterecomy over myomectomy\par will recommend pt for MIGS

## 2022-03-11 NOTE — PHYSICAL EXAM
[Appropriately responsive] : appropriately responsive [Alert] : alert [No Acute Distress] : no acute distress [Oriented x3] : oriented x3 [Labia Majora] : normal [Labia Minora] : normal [Normal] : normal [Enlarged ___ wks] : enlarged [unfilled] ~Uweeks [Uterine Adnexae] : normal

## 2022-03-11 NOTE — HISTORY OF PRESENT ILLNESS
[TextBox_4] : 46yo presents to discuss fibroids\par States underwear have been drenched - just wet - denies itching, burning, odor \par Eats ice like crazy\par Stomach feels bigger\par Periods are so heavy - soaks through pads and cant even make it into work\par Denies dizziness or lightheadedness

## 2022-03-14 LAB
BASOPHILS # BLD AUTO: 0.05 K/UL
BASOPHILS NFR BLD AUTO: 0.7 %
CANDIDA VAG CYTO: NOT DETECTED
EOSINOPHIL # BLD AUTO: 0.13 K/UL
EOSINOPHIL NFR BLD AUTO: 1.8 %
G VAGINALIS+PREV SP MTYP VAG QL MICRO: DETECTED
HCT VFR BLD CALC: 32.1 %
HGB BLD-MCNC: 9.2 G/DL
IMM GRANULOCYTES NFR BLD AUTO: 0.3 %
LYMPHOCYTES # BLD AUTO: 3.2 K/UL
LYMPHOCYTES NFR BLD AUTO: 43.4 %
MAN DIFF?: NORMAL
MCHC RBC-ENTMCNC: 22.5 PG
MCHC RBC-ENTMCNC: 28.7 GM/DL
MCV RBC AUTO: 78.7 FL
MONOCYTES # BLD AUTO: 0.42 K/UL
MONOCYTES NFR BLD AUTO: 5.7 %
NEUTROPHILS # BLD AUTO: 3.56 K/UL
NEUTROPHILS NFR BLD AUTO: 48.1 %
PLATELET # BLD AUTO: 435 K/UL
RBC # BLD: 4.08 M/UL
RBC # FLD: 17 %
T VAGINALIS VAG QL WET PREP: NOT DETECTED
WBC # FLD AUTO: 7.38 K/UL

## 2022-03-15 ENCOUNTER — NON-APPOINTMENT (OUTPATIENT)
Age: 47
End: 2022-03-15

## 2022-03-25 ENCOUNTER — APPOINTMENT (OUTPATIENT)
Dept: OBGYN | Facility: CLINIC | Age: 47
End: 2022-03-25

## 2022-04-04 ENCOUNTER — NON-APPOINTMENT (OUTPATIENT)
Age: 47
End: 2022-04-04

## 2022-04-06 ENCOUNTER — NON-APPOINTMENT (OUTPATIENT)
Age: 47
End: 2022-04-06

## 2022-04-07 ENCOUNTER — NON-APPOINTMENT (OUTPATIENT)
Age: 47
End: 2022-04-07

## 2022-04-08 ENCOUNTER — APPOINTMENT (OUTPATIENT)
Dept: OBGYN | Facility: CLINIC | Age: 47
End: 2022-04-08

## 2022-05-05 ENCOUNTER — APPOINTMENT (OUTPATIENT)
Dept: OBGYN | Facility: CLINIC | Age: 47
End: 2022-05-05
Payer: COMMERCIAL

## 2022-05-05 VITALS — DIASTOLIC BLOOD PRESSURE: 74 MMHG | HEART RATE: 88 BPM | SYSTOLIC BLOOD PRESSURE: 110 MMHG | HEIGHT: 62 IN

## 2022-05-05 PROCEDURE — 99214 OFFICE O/P EST MOD 30 MIN: CPT

## 2022-05-05 NOTE — PHYSICAL EXAM
[FreeTextEntry7] : obese, soft, NT, ND, ut palpable. [Labia Majora] : normal [Normal] : normal [FreeTextEntry6] : Ut ~ 18 wks, mobile, heavy

## 2022-05-05 NOTE — DISCUSSION/SUMMARY
[FreeTextEntry1] : 48 yo P3 w/symptomatic ut myomas.\par Pt w/HMB and mass-effect symptoms.\par Interested in future fertility\par \par Plan\par MRI\par RTO for results and plan (TEB).\par  Will discuss myomectomy vs hyst vs expectant management.\par \par Letter to Dr. Sherie Mcgregor

## 2022-05-24 ENCOUNTER — OUTPATIENT (OUTPATIENT)
Dept: OUTPATIENT SERVICES | Facility: HOSPITAL | Age: 47
LOS: 1 days | End: 2022-05-24
Payer: COMMERCIAL

## 2022-05-24 ENCOUNTER — APPOINTMENT (OUTPATIENT)
Dept: MRI IMAGING | Facility: CLINIC | Age: 47
End: 2022-05-24
Payer: COMMERCIAL

## 2022-05-24 DIAGNOSIS — Z98.890 OTHER SPECIFIED POSTPROCEDURAL STATES: Chronic | ICD-10-CM

## 2022-05-24 DIAGNOSIS — D25.9 LEIOMYOMA OF UTERUS, UNSPECIFIED: ICD-10-CM

## 2022-05-24 DIAGNOSIS — O03.9 COMPLETE OR UNSPECIFIED SPONTANEOUS ABORTION WITHOUT COMPLICATION: Chronic | ICD-10-CM

## 2022-05-24 DIAGNOSIS — Z90.49 ACQUIRED ABSENCE OF OTHER SPECIFIED PARTS OF DIGESTIVE TRACT: Chronic | ICD-10-CM

## 2022-05-24 PROCEDURE — A9585: CPT

## 2022-05-24 PROCEDURE — 72197 MRI PELVIS W/O & W/DYE: CPT | Mod: 26

## 2022-05-24 PROCEDURE — 72197 MRI PELVIS W/O & W/DYE: CPT

## 2022-05-31 ENCOUNTER — APPOINTMENT (OUTPATIENT)
Dept: OBGYN | Facility: CLINIC | Age: 47
End: 2022-05-31
Payer: COMMERCIAL

## 2022-05-31 VITALS
BODY MASS INDEX: 37.54 KG/M2 | SYSTOLIC BLOOD PRESSURE: 128 MMHG | DIASTOLIC BLOOD PRESSURE: 83 MMHG | WEIGHT: 204 LBS | HEART RATE: 78 BPM | HEIGHT: 62 IN

## 2022-05-31 PROCEDURE — 99214 OFFICE O/P EST MOD 30 MIN: CPT

## 2022-05-31 NOTE — DISCUSSION/SUMMARY
[FreeTextEntry1] : 48 yo P3 here for f/u after MRI prior to myomectomy.\par Pt interested in future fertility.\par \par MRI reviewed.\par Multiple, multiple myomas SM, IM, SS.\par \par Plan\par Spoke to pt about multiple myomas. We spoke about myomectomy vs hysterectomy.\par Explained that she would be trying to conceive at age 48. Explained that ovaries may not be functioning at optimal levels at that time.\par \par Schedule hsc rod and robotic rod (long case)\par TEB prior to surgery to confirm interest in myomectomy rather than TLH\par \par

## 2022-05-31 NOTE — HISTORY OF PRESENT ILLNESS
[FreeTextEntry1] : 48 yo P3 here for f/u after MRI prior to myomectomy.\par Pt interested in future fertility.

## 2022-08-15 ENCOUNTER — APPOINTMENT (OUTPATIENT)
Dept: OBGYN | Facility: CLINIC | Age: 47
End: 2022-08-15

## 2022-08-15 VITALS
BODY MASS INDEX: 37.5 KG/M2 | SYSTOLIC BLOOD PRESSURE: 124 MMHG | WEIGHT: 203.8 LBS | HEIGHT: 62 IN | DIASTOLIC BLOOD PRESSURE: 80 MMHG

## 2022-08-15 DIAGNOSIS — Z11.3 ENCOUNTER FOR SCREENING FOR INFECTIONS WITH A PREDOMINANTLY SEXUAL MODE OF TRANSMISSION: ICD-10-CM

## 2022-08-15 DIAGNOSIS — N92.6 IRREGULAR MENSTRUATION, UNSPECIFIED: ICD-10-CM

## 2022-08-15 PROCEDURE — 36415 COLL VENOUS BLD VENIPUNCTURE: CPT

## 2022-08-15 PROCEDURE — 99213 OFFICE O/P EST LOW 20 MIN: CPT | Mod: 25

## 2022-08-15 PROCEDURE — 99396 PREV VISIT EST AGE 40-64: CPT

## 2022-08-15 RX ORDER — METRONIDAZOLE 7.5 MG/G
0.75 GEL VAGINAL
Qty: 1 | Refills: 0 | Status: COMPLETED | COMMUNITY
Start: 2022-03-14 | End: 2022-08-15

## 2022-08-15 RX ORDER — FLUCONAZOLE 150 MG/1
150 TABLET ORAL
Qty: 1 | Refills: 0 | Status: COMPLETED | COMMUNITY
Start: 2019-01-15 | End: 2022-08-15

## 2022-08-15 RX ORDER — BENZONATATE 100 MG/1
100 CAPSULE ORAL
Qty: 15 | Refills: 0 | Status: COMPLETED | COMMUNITY
Start: 2022-04-29

## 2022-08-15 RX ORDER — FLUTICASONE PROPIONATE 50 UG/1
50 SPRAY, METERED NASAL
Qty: 10 | Refills: 0 | Status: COMPLETED | COMMUNITY
Start: 2022-04-29

## 2022-08-15 RX ORDER — ALBUTEROL SULFATE 90 UG/1
108 (90 BASE) INHALANT RESPIRATORY (INHALATION)
Qty: 7 | Refills: 0 | Status: COMPLETED | COMMUNITY
Start: 2022-04-29

## 2022-08-15 RX ORDER — OSELTAMIVIR PHOSPHATE 75 MG/1
75 CAPSULE ORAL
Qty: 10 | Refills: 0 | Status: COMPLETED | COMMUNITY
Start: 2022-05-04

## 2022-08-15 NOTE — PHYSICAL EXAM
[Appropriately responsive] : appropriately responsive [Alert] : alert [No Acute Distress] : no acute distress [Soft] : soft [Non-tender] : non-tender [Non-distended] : non-distended [Oriented x3] : oriented x3 [Examination Of The Breasts] : a normal appearance [No Masses] : no breast masses were palpable [Labia Majora] : normal [Labia Minora] : normal [Normal] : normal [Enlarged ___ wks] : enlarged [unfilled] ~Uweeks [Uterine Adnexae] : normal

## 2022-08-15 NOTE — HISTORY OF PRESENT ILLNESS
[TextBox_4] : 48yo presents for annual exam \par pt states she got her period, it ended, and then she noticed 2 nights after having sex for first time in a long time, started to spot.  \par Has surgery shceuled in December with Dr Nguyễn.  States bleeding was spotting - first time this ever happened.   [Mammogramdate] : 2021 [PapSmeardate] : 2021

## 2022-08-16 LAB
C TRACH RRNA SPEC QL NAA+PROBE: NOT DETECTED
HBV SURFACE AG SER QL: NONREACTIVE
HCV AB SER QL: NONREACTIVE
HCV S/CO RATIO: 0.08 S/CO
HIV1+2 AB SPEC QL IA.RAPID: NONREACTIVE
HPV HIGH+LOW RISK DNA PNL CVX: NOT DETECTED
N GONORRHOEA RRNA SPEC QL NAA+PROBE: NOT DETECTED
SOURCE AMPLIFICATION: NORMAL
SOURCE AMPLIFICATION: NORMAL
T PALLIDUM AB SER QL IA: NEGATIVE
T VAGINALIS RRNA SPEC QL NAA+PROBE: NOT DETECTED

## 2022-08-17 ENCOUNTER — NON-APPOINTMENT (OUTPATIENT)
Age: 47
End: 2022-08-17

## 2022-08-19 LAB — CYTOLOGY CVX/VAG DOC THIN PREP: ABNORMAL

## 2022-09-12 ENCOUNTER — APPOINTMENT (OUTPATIENT)
Dept: OBGYN | Facility: CLINIC | Age: 47
End: 2022-09-12

## 2022-09-12 ENCOUNTER — ASOB RESULT (OUTPATIENT)
Age: 47
End: 2022-09-12

## 2022-09-12 PROCEDURE — 76830 TRANSVAGINAL US NON-OB: CPT

## 2022-09-27 ENCOUNTER — OUTPATIENT (OUTPATIENT)
Dept: OUTPATIENT SERVICES | Facility: HOSPITAL | Age: 47
LOS: 1 days | End: 2022-09-27
Payer: COMMERCIAL

## 2022-09-27 ENCOUNTER — APPOINTMENT (OUTPATIENT)
Dept: ULTRASOUND IMAGING | Facility: IMAGING CENTER | Age: 47
End: 2022-09-27

## 2022-09-27 ENCOUNTER — RESULT REVIEW (OUTPATIENT)
Age: 47
End: 2022-09-27

## 2022-09-27 ENCOUNTER — APPOINTMENT (OUTPATIENT)
Dept: MAMMOGRAPHY | Facility: IMAGING CENTER | Age: 47
End: 2022-09-27

## 2022-09-27 DIAGNOSIS — Z00.8 ENCOUNTER FOR OTHER GENERAL EXAMINATION: ICD-10-CM

## 2022-09-27 DIAGNOSIS — O03.9 COMPLETE OR UNSPECIFIED SPONTANEOUS ABORTION WITHOUT COMPLICATION: Chronic | ICD-10-CM

## 2022-09-27 DIAGNOSIS — Z98.890 OTHER SPECIFIED POSTPROCEDURAL STATES: Chronic | ICD-10-CM

## 2022-09-27 DIAGNOSIS — Z01.419 ENCOUNTER FOR GYNECOLOGICAL EXAMINATION (GENERAL) (ROUTINE) WITHOUT ABNORMAL FINDINGS: ICD-10-CM

## 2022-09-27 DIAGNOSIS — Z90.49 ACQUIRED ABSENCE OF OTHER SPECIFIED PARTS OF DIGESTIVE TRACT: Chronic | ICD-10-CM

## 2022-09-27 PROCEDURE — 76641 ULTRASOUND BREAST COMPLETE: CPT | Mod: 26,50

## 2022-09-27 PROCEDURE — 77063 BREAST TOMOSYNTHESIS BI: CPT

## 2022-09-27 PROCEDURE — 77067 SCR MAMMO BI INCL CAD: CPT | Mod: 26

## 2022-09-27 PROCEDURE — 77065 DX MAMMO INCL CAD UNI: CPT | Mod: 26,GG,LT

## 2022-09-27 PROCEDURE — 77063 BREAST TOMOSYNTHESIS BI: CPT | Mod: 26

## 2022-09-27 PROCEDURE — 77067 SCR MAMMO BI INCL CAD: CPT

## 2022-09-27 PROCEDURE — 76641 ULTRASOUND BREAST COMPLETE: CPT

## 2022-09-27 PROCEDURE — 77065 DX MAMMO INCL CAD UNI: CPT

## 2022-09-28 ENCOUNTER — NON-APPOINTMENT (OUTPATIENT)
Age: 47
End: 2022-09-28

## 2022-09-29 ENCOUNTER — APPOINTMENT (OUTPATIENT)
Dept: OBGYN | Facility: CLINIC | Age: 47
End: 2022-09-29

## 2022-09-29 DIAGNOSIS — N92.0 EXCESSIVE AND FREQUENT MENSTRUATION WITH REGULAR CYCLE: ICD-10-CM

## 2022-09-29 PROCEDURE — 81025 URINE PREGNANCY TEST: CPT

## 2022-09-29 PROCEDURE — 58100 BIOPSY OF UTERUS LINING: CPT

## 2022-09-29 NOTE — PROCEDURE
[Endometrial Biopsy] : Endometrial biopsy [Irregular Bleeding] : irregular uterine bleeding [Risks] : risks [Benefits] : benefits [Ibuprofen ___ mg] : ibuprofen [unfilled] ~Umg [Betadine] : Betadine [Tenaculum] : Tenaculum [Easy Passage] : Easy passage [Sounded to ___ cm] : sounded to [unfilled] ~Ucm [Abundant] : abundant [Sent to Pathology] : placed in buffered formalin and sent for pathology [Tolerated Well] : Patient tolerated the procedure well [No Complications] : No complications [de-identified] : currette kept filling quickly - had to use "explora curette" with syringe

## 2022-09-30 LAB — HCG UR QL: NEGATIVE

## 2022-10-03 ENCOUNTER — RESULT REVIEW (OUTPATIENT)
Age: 47
End: 2022-10-03

## 2022-10-03 ENCOUNTER — OUTPATIENT (OUTPATIENT)
Dept: OUTPATIENT SERVICES | Facility: HOSPITAL | Age: 47
LOS: 1 days | End: 2022-10-03
Payer: COMMERCIAL

## 2022-10-03 ENCOUNTER — APPOINTMENT (OUTPATIENT)
Dept: MAMMOGRAPHY | Facility: IMAGING CENTER | Age: 47
End: 2022-10-03

## 2022-10-03 DIAGNOSIS — Z98.890 OTHER SPECIFIED POSTPROCEDURAL STATES: Chronic | ICD-10-CM

## 2022-10-03 DIAGNOSIS — O03.9 COMPLETE OR UNSPECIFIED SPONTANEOUS ABORTION WITHOUT COMPLICATION: Chronic | ICD-10-CM

## 2022-10-03 DIAGNOSIS — Z90.49 ACQUIRED ABSENCE OF OTHER SPECIFIED PARTS OF DIGESTIVE TRACT: Chronic | ICD-10-CM

## 2022-10-03 DIAGNOSIS — Z00.8 ENCOUNTER FOR OTHER GENERAL EXAMINATION: ICD-10-CM

## 2022-10-03 PROCEDURE — 88341 IMHCHEM/IMCYTCHM EA ADD ANTB: CPT

## 2022-10-03 PROCEDURE — 19081 BX BREAST 1ST LESION STRTCTC: CPT | Mod: LT

## 2022-10-03 PROCEDURE — 88377 M/PHMTRC ALYS ISHQUANT/SEMIQ: CPT

## 2022-10-03 PROCEDURE — 77065 DX MAMMO INCL CAD UNI: CPT | Mod: 26,LT

## 2022-10-03 PROCEDURE — 88305 TISSUE EXAM BY PATHOLOGIST: CPT

## 2022-10-03 PROCEDURE — 88342 IMHCHEM/IMCYTCHM 1ST ANTB: CPT | Mod: 26,59

## 2022-10-03 PROCEDURE — 88377 M/PHMTRC ALYS ISHQUANT/SEMIQ: CPT | Mod: 26

## 2022-10-03 PROCEDURE — 88360 TUMOR IMMUNOHISTOCHEM/MANUAL: CPT

## 2022-10-03 PROCEDURE — 88305 TISSUE EXAM BY PATHOLOGIST: CPT | Mod: 26

## 2022-10-03 PROCEDURE — 19081 BX BREAST 1ST LESION STRTCTC: CPT

## 2022-10-03 PROCEDURE — A4648: CPT

## 2022-10-03 PROCEDURE — 77065 DX MAMMO INCL CAD UNI: CPT

## 2022-10-03 PROCEDURE — 88360 TUMOR IMMUNOHISTOCHEM/MANUAL: CPT | Mod: 26

## 2022-10-08 LAB — CORE LAB BIOPSY: NORMAL

## 2022-10-12 ENCOUNTER — NON-APPOINTMENT (OUTPATIENT)
Age: 47
End: 2022-10-12

## 2022-10-12 LAB — SURGICAL PATHOLOGY STUDY: SIGNIFICANT CHANGE UP

## 2022-10-13 ENCOUNTER — NON-APPOINTMENT (OUTPATIENT)
Age: 47
End: 2022-10-13

## 2022-10-14 ENCOUNTER — NON-APPOINTMENT (OUTPATIENT)
Age: 47
End: 2022-10-14

## 2022-10-17 ENCOUNTER — APPOINTMENT (OUTPATIENT)
Dept: OBGYN | Facility: CLINIC | Age: 47
End: 2022-10-17

## 2022-10-17 PROBLEM — Z78.9 NO FAMILY HISTORY OF BREAST CANCER: Status: ACTIVE | Noted: 2022-10-17

## 2022-10-19 ENCOUNTER — APPOINTMENT (OUTPATIENT)
Dept: SURGICAL ONCOLOGY | Facility: CLINIC | Age: 47
End: 2022-10-19

## 2022-10-19 ENCOUNTER — NON-APPOINTMENT (OUTPATIENT)
Age: 47
End: 2022-10-19

## 2022-10-19 VITALS
WEIGHT: 200 LBS | HEIGHT: 62 IN | RESPIRATION RATE: 16 BRPM | OXYGEN SATURATION: 100 % | DIASTOLIC BLOOD PRESSURE: 85 MMHG | BODY MASS INDEX: 36.8 KG/M2 | SYSTOLIC BLOOD PRESSURE: 131 MMHG | HEART RATE: 79 BPM

## 2022-10-19 DIAGNOSIS — Z78.9 OTHER SPECIFIED HEALTH STATUS: ICD-10-CM

## 2022-10-19 PROCEDURE — 99215 OFFICE O/P EST HI 40 MIN: CPT

## 2022-10-19 PROCEDURE — 99205 OFFICE O/P NEW HI 60 MIN: CPT

## 2022-10-19 NOTE — PHYSICAL EXAM
[Normocephalic] : normocephalic [Atraumatic] : atraumatic [EOMI] : extra ocular movement intact [PERRL] : pupils equal, round and reactive to light [Sclera nonicteric] : sclera nonicteric [Supple] : supple [No Supraclavicular Adenopathy] : no supraclavicular adenopathy [No Cervical Adenopathy] : no cervical adenopathy [No Thyromegaly] : no thyromegaly [Examined in the supine and seated position] : examined in the supine and seated position [Symmetrical] : symmetrical [Bra Size: ___] : Bra Size: [unfilled] [Grade 2] : Ptosis Grade 2 [No dominant masses] : no dominant masses in right breast  [No dominant masses] : no dominant masses left breast [No Nipple Retraction] : no left nipple retraction [No Nipple Discharge] : no left nipple discharge [Breast Mass Right Breast ___cm] : no masses [Breast Mass Left Breast ___cm] : no masses [Breast Nipple Inversion] : nipples not inverted [Breast Nipple Retraction] : nipples not retracted [Breast Nipple Flattening] : nipples not flattened [No Axillary Lymphadenopathy] : no left axillary lymphadenopathy [No Edema] : no edema [No Rashes] : no rashes [No Ulceration] : no ulceration [de-identified] : non-labored respirations  [de-identified] : large upper chest tattoo [de-identified] : Left nipple with cleft, longstanding per pt

## 2022-10-19 NOTE — HISTORY OF PRESENT ILLNESS
[FreeTextEntry1] : The patient is a 47 year F referred by Dr. Sherie Mcgregor for consultation regarding L tubular carcinoma, here for initial visit. \par \par Patient previously had B/L USG-CNB in 5/2021 revealing R 9:00 PASH and L 10:00 fibroadenomatoid and PASH, concordant. Radiology recommended f/u imaging in 6 months. She denies any breast symptoms prior to her imaging this year.\par \par Most recent imaging:\par 9/27/2022 B/L DM (NW) TC 12.% revealed heterogeneously dense breasts \par - L UOQ architectural distortion -> Stereo\par - B/L previously bx masses are not significantly changed and contain bx markers\par - Nodular parenchymal pattern\par \par 9/27/2022 B/L US\par - R 9:00 N14 2.1 x 1.5 x 3 cm heterogenous mass, stable from 5/2021 (previously bx PASH). W/in this mass there is a 0.9 x 0.7 x 1 cm cyst.\par - R UOQ multiple cysts and cyst clusters ranging in size measuring up to 3 mm\par - L 10:00 N8 1.8 x 1.2 x 2.3 cm hypoechoic mass (previously bx yielding fibroadenomatoid nodule and PASH) unchanged from 5/2021.\par - BR 4B\par \par 10/3/2022 L Stereo \par - L UOQ (tophat): Tubular carcinoma, G1, 0.3 cm. Calcs and necrosis absent. DCIS, cribriform, low grade atypia present w/ microcalcs. Smooth muscle myosin heavy chain and P63 show absence of myoepithelial cells in the infiltrative glands, support histologic dx of tubular carcinoma. IDP w/ microcalcs.  \par - %, [place] 90% strong, HER2 2+ Equivocal, CISH pending\par \par She reports anemia from fibroids. She is scheduled for a myomectomy in Dec 2022.\par Patient's past surgical history include D&C, hysteroscopy w/ poly removal, B/L breast biopsies (5/2021).\par She takes no medications.\par Patient has no family history of breast cancer, or other cancers.\par

## 2022-10-19 NOTE — ASSESSMENT
[FreeTextEntry1] : The patient is a 47 year F referred by Dr. 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 First we discussed her histopathology and biomarkers in terms of prognosis and adjuvant therapy. We discussed surgical options including mastectomy versus lumpectomy. She understands that she would not get a better outcome or longer survival with the mastectomy, although risk of local recurrence is lower. After breast conserving surgery, she may see asymmetry in size. She understands that radiation therapy and postoperative evaluation by Medical Oncology are integral parts of breast-conserving treatment and these will be addressed postoperatively. If we proceed with mastectomy, there is a still a chance that radiation will be recommended but less likely.\par  \par If we proceed with lumpectomy, Ms Gandhi understands that it will be important to obtain clear margins and there is a 5-10% risk of having to go back for additional tissue. With mastectomy, there is still a small amount of breast tissue (probably on the order of 5%) that remains which will continue to require yearly clinical examination.\par  \par The risks of surgery include bleeding, infection, scarring, numbness, possible discrepancy in breast size with lumpectomy/reconstructed breast.  At the time of lumpectomy, a pre-operative localization of the lesion is required.\par  \par \par An MRI is recommended for further evaluation of disease extent and the possible presence of multicentric or contralateral disease.  \par   \par \par We discussed the role of sentinel node biopsy. This will confirm the stage and extent of disease. The risk of lymphedema is 5% with a sentinel lymph node biopsy and 15% with an axillary lymph node dissection.\par  \par Preoperative, intraoperative, and postoperative considerations were reviewed including anesthetic management and what she can expect when she is recovering from surgery. Risks, benefits, alternatives, and various management options were discussed with the patient.\par \par Following our discussion, we have decided to proceed with Left breast lumpectomy with MagSeed localization ( 1 site), L SLNB.\par \par We discussed the risks, benefits and limitations, and implications of genetic testing. We also discussed the psychosocial implications of genetic testing. Possible test results were reviewed along with associated medical management options.\par \par Ms Gandhi consented to genetic testing and blood was drawn and sent to [facility] today.\par  \par   \par Ms Gandhi verbalizes her understanding of the procedure and the risks/benefits/complications and alternatives were discussed questions were answered. She knows to call me if she has any additional questions or concerns.\par  \par \par Plan:\par  - MRI\par  - GT\par  - F/u CISH\par - Left breast lumpectomy with MagSeed localization ( 1 site), L SLNB.\par \par

## 2022-10-19 NOTE — PAST MEDICAL HISTORY
[Menstruating] : The patient is menstruating [Menarche Age ____] : age at menarche was [unfilled] [History of Hormone Replacement Treatment] : has no history of hormone replacement treatment [Definite ___ (Date)] : the last menstrual period was [unfilled] [Irregular Cycle Intervals] : are  irregular [Total Preg ___] : G[unfilled] [Live Births ___] : P[unfilled]  [Age At Live Birth ___] : Age at live birth: [unfilled] [FreeTextEntry5] : D&C, uterine polypectomy [FreeTextEntry6] : none [FreeTextEntry7] : few months, not currently [FreeTextEntry8] : none

## 2022-10-19 NOTE — CONSULT LETTER
[Dear  ___] : Dear  [unfilled], [Consult Letter:] : I had the pleasure of evaluating your patient, [unfilled]. [Please see my note below.] : Please see my note below. [Consult Closing:] : Thank you very much for allowing me to participate in the care of this patient.  If you have any questions, please do not hesitate to contact me. [Sincerely,] : Sincerely, [FreeTextEntry3] : Suzan Troncoso MD\par Breast Surgeon\par Division of Surgical Oncology\par Department of Surgery\par [address] [place] [address]\[place] [address] \par Tel: [phone]\par Fax: [phone]\par Email: [email or web address]

## 2022-10-20 ENCOUNTER — APPOINTMENT (OUTPATIENT)
Dept: OBGYN | Facility: CLINIC | Age: 47
End: 2022-10-20

## 2022-10-21 ENCOUNTER — APPOINTMENT (OUTPATIENT)
Dept: MRI IMAGING | Facility: CLINIC | Age: 47
End: 2022-10-21

## 2022-10-24 ENCOUNTER — OUTPATIENT (OUTPATIENT)
Dept: OUTPATIENT SERVICES | Facility: HOSPITAL | Age: 47
LOS: 1 days | End: 2022-10-24
Payer: COMMERCIAL

## 2022-10-24 ENCOUNTER — APPOINTMENT (OUTPATIENT)
Dept: MRI IMAGING | Facility: IMAGING CENTER | Age: 47
End: 2022-10-24

## 2022-10-24 DIAGNOSIS — Z98.890 OTHER SPECIFIED POSTPROCEDURAL STATES: Chronic | ICD-10-CM

## 2022-10-24 DIAGNOSIS — O03.9 COMPLETE OR UNSPECIFIED SPONTANEOUS ABORTION WITHOUT COMPLICATION: Chronic | ICD-10-CM

## 2022-10-24 DIAGNOSIS — Z90.49 ACQUIRED ABSENCE OF OTHER SPECIFIED PARTS OF DIGESTIVE TRACT: Chronic | ICD-10-CM

## 2022-10-24 DIAGNOSIS — C50.919 MALIGNANT NEOPLASM OF UNSPECIFIED SITE OF UNSPECIFIED FEMALE BREAST: ICD-10-CM

## 2022-10-24 PROCEDURE — C8937: CPT

## 2022-10-24 PROCEDURE — 77049 MRI BREAST C-+ W/CAD BI: CPT | Mod: 26

## 2022-10-24 PROCEDURE — A9585: CPT

## 2022-10-24 PROCEDURE — C8908: CPT

## 2022-10-26 ENCOUNTER — APPOINTMENT (OUTPATIENT)
Dept: OBGYN | Facility: CLINIC | Age: 47
End: 2022-10-26

## 2022-10-26 VITALS — DIASTOLIC BLOOD PRESSURE: 84 MMHG | WEIGHT: 200 LBS | BODY MASS INDEX: 36.58 KG/M2 | SYSTOLIC BLOOD PRESSURE: 125 MMHG

## 2022-10-26 DIAGNOSIS — B96.89 ACUTE VAGINITIS: ICD-10-CM

## 2022-10-26 DIAGNOSIS — N76.0 ACUTE VAGINITIS: ICD-10-CM

## 2022-10-26 PROCEDURE — 99214 OFFICE O/P EST MOD 30 MIN: CPT

## 2022-10-26 NOTE — PHYSICAL EXAM
[Labia Majora] : normal [Labia Minora] : normal [Discharge] : a  ~M vaginal discharge was present [Moderate] : moderate [Foul Smelling] : foul smelling [Clear] : clear [Watery] : watery [Normal] : normal [Uterine Adnexae] : normal

## 2022-10-27 ENCOUNTER — NON-APPOINTMENT (OUTPATIENT)
Age: 47
End: 2022-10-27

## 2022-10-27 LAB
CANDIDA VAG CYTO: NOT DETECTED
G VAGINALIS+PREV SP MTYP VAG QL MICRO: DETECTED
T VAGINALIS VAG QL WET PREP: NOT DETECTED

## 2022-11-04 ENCOUNTER — APPOINTMENT (OUTPATIENT)
Dept: MAMMOGRAPHY | Facility: IMAGING CENTER | Age: 47
End: 2022-11-04

## 2022-11-07 ENCOUNTER — OUTPATIENT (OUTPATIENT)
Dept: OUTPATIENT SERVICES | Facility: HOSPITAL | Age: 47
LOS: 1 days | End: 2022-11-07
Payer: COMMERCIAL

## 2022-11-07 VITALS
WEIGHT: 198.86 LBS | RESPIRATION RATE: 18 BRPM | OXYGEN SATURATION: 99 % | TEMPERATURE: 99 F | SYSTOLIC BLOOD PRESSURE: 120 MMHG | HEIGHT: 62.5 IN | DIASTOLIC BLOOD PRESSURE: 80 MMHG | HEART RATE: 80 BPM

## 2022-11-07 DIAGNOSIS — Z98.890 OTHER SPECIFIED POSTPROCEDURAL STATES: Chronic | ICD-10-CM

## 2022-11-07 DIAGNOSIS — O03.9 COMPLETE OR UNSPECIFIED SPONTANEOUS ABORTION WITHOUT COMPLICATION: Chronic | ICD-10-CM

## 2022-11-07 DIAGNOSIS — C50.919 MALIGNANT NEOPLASM OF UNSPECIFIED SITE OF UNSPECIFIED FEMALE BREAST: ICD-10-CM

## 2022-11-07 DIAGNOSIS — C50.912 MALIGNANT NEOPLASM OF UNSPECIFIED SITE OF LEFT FEMALE BREAST: ICD-10-CM

## 2022-11-07 DIAGNOSIS — Z01.818 ENCOUNTER FOR OTHER PREPROCEDURAL EXAMINATION: ICD-10-CM

## 2022-11-07 DIAGNOSIS — Z90.49 ACQUIRED ABSENCE OF OTHER SPECIFIED PARTS OF DIGESTIVE TRACT: Chronic | ICD-10-CM

## 2022-11-07 RX ORDER — ACETAMINOPHEN 500 MG
2 TABLET ORAL
Qty: 0 | Refills: 0 | DISCHARGE

## 2022-11-07 RX ORDER — IBUPROFEN 200 MG
1 TABLET ORAL
Qty: 0 | Refills: 0 | DISCHARGE

## 2022-11-07 NOTE — H&P PST ADULT - NSICDXPASTSURGICALHX_GEN_ALL_CORE_FT
PAST SURGICAL HISTORY:       H/O breast biopsy bilateral 21 radiology    H/O umbilical hernia repair     History of D&C     S/P cholecystectomy with hernia repair 21 - [facility]

## 2022-11-07 NOTE — H&P PST ADULT - PROBLEM SELECTOR PLAN 1
Scheduled fro left breast lumpectomy w/magseed localization, left sentinel lymph node biopsy with Dr Troncoso on 11/10/2022.  Per op instructions given and patient verbalized understanding.  CBC, requested from pcp.  COVID-19 testing pending.  NPO after midnight night before procedure.  To stop all asa, NSAIDs, vitamins and supplements 5 days prior to procedure.  Chlorhexidene wash given with instructions.  UCG day of surgery

## 2022-11-07 NOTE — H&P PST ADULT - NSICDXPASTMEDICALHX_GEN_ALL_CORE_FT
PAST MEDICAL HISTORY:  H/O vertigo     Need for dental care 5/28/21 , had dental work today placed on amoxicillin for 5 days    Uterine fibroid     Uterine polyp

## 2022-11-07 NOTE — H&P PST ADULT - NSANTHOSAYNRD_GEN_A_CORE
neck 14 inches/No. ELAN screening performed.  STOP BANG Legend: 0-2 = LOW Risk; 3-4 = INTERMEDIATE Risk; 5-8 = HIGH Risk

## 2022-11-07 NOTE — H&P PST ADULT - MUSCULOSKELETAL
ROM intact/no joint swelling/no joint erythema/no joint warmth/no calf tenderness negative normal/ROM intact/no joint swelling/no joint erythema/no joint warmth/no calf tenderness

## 2022-11-07 NOTE — H&P PST ADULT - LYMPHATIC
posterior cervical L/posterior cervical R/anterior cervical L/anterior cervical R posterior cervical L/posterior cervical R/anterior cervical L/anterior cervical R/No lymphadedenopathy

## 2022-11-07 NOTE — H&P PST ADULT - HISTORY OF PRESENT ILLNESS
46 y/o female presents for PST and COVID-19 testing.   Scheduled fro left breast lumpectomy w/magseed localization, left sentinel lymph node biopsy with Dr Troncoso on 11/10/2022.    48 y/o female with a PMH of anemia and vertigo presents for PST and COVID-19 testing.  Patient reports abnormal mammogram done 10/2022 for screening.  Additional work up pos for malignancy and recommended for surgical intervention.  Otherwise feeling well at [facility] today with no recent cough, fever or acute illness.   Scheduled fro left breast lumpectomy w/magseed localization, left sentinel lymph node biopsy with Dr Troncoso on 11/10/2022.

## 2022-11-07 NOTE — H&P PST ADULT - LAB RESULTS AND INTERPRETATION
cbc, bmp, ucg  in pst CBC from pcp  COVID-19 pcr at [facility] earlier today - results pending

## 2022-11-08 ENCOUNTER — APPOINTMENT (OUTPATIENT)
Dept: MAMMOGRAPHY | Facility: CLINIC | Age: 47
End: 2022-11-08

## 2022-11-08 ENCOUNTER — OUTPATIENT (OUTPATIENT)
Dept: OUTPATIENT SERVICES | Facility: HOSPITAL | Age: 47
LOS: 1 days | End: 2022-11-08
Payer: COMMERCIAL

## 2022-11-08 ENCOUNTER — LABORATORY RESULT (OUTPATIENT)
Age: 47
End: 2022-11-08

## 2022-11-08 ENCOUNTER — RESULT REVIEW (OUTPATIENT)
Age: 47
End: 2022-11-08

## 2022-11-08 DIAGNOSIS — O03.9 COMPLETE OR UNSPECIFIED SPONTANEOUS ABORTION WITHOUT COMPLICATION: Chronic | ICD-10-CM

## 2022-11-08 DIAGNOSIS — Z98.890 OTHER SPECIFIED POSTPROCEDURAL STATES: Chronic | ICD-10-CM

## 2022-11-08 DIAGNOSIS — Z90.49 ACQUIRED ABSENCE OF OTHER SPECIFIED PARTS OF DIGESTIVE TRACT: Chronic | ICD-10-CM

## 2022-11-08 DIAGNOSIS — Z00.8 ENCOUNTER FOR OTHER GENERAL EXAMINATION: ICD-10-CM

## 2022-11-08 PROCEDURE — C1739: CPT

## 2022-11-08 PROCEDURE — A4648: CPT

## 2022-11-08 PROCEDURE — 19281 PERQ DEVICE BREAST 1ST IMAG: CPT | Mod: LT

## 2022-11-08 PROCEDURE — 19281 PERQ DEVICE BREAST 1ST IMAG: CPT

## 2022-11-09 ENCOUNTER — RESULT REVIEW (OUTPATIENT)
Age: 47
End: 2022-11-09

## 2022-11-09 ENCOUNTER — TRANSCRIPTION ENCOUNTER (OUTPATIENT)
Age: 47
End: 2022-11-09

## 2022-11-09 LAB
BASOPHILS # BLD AUTO: 0.05 K/UL
BASOPHILS NFR BLD AUTO: 0.7 %
EOSINOPHIL # BLD AUTO: 0.13 K/UL
EOSINOPHIL NFR BLD AUTO: 1.9 %
HCT VFR BLD CALC: 30.2 %
HGB BLD-MCNC: 8.9 G/DL
IMM GRANULOCYTES NFR BLD AUTO: 0.1 %
LYMPHOCYTES # BLD AUTO: 2.73 K/UL
LYMPHOCYTES NFR BLD AUTO: 40 %
MAN DIFF?: NORMAL
MCHC RBC-ENTMCNC: 20.9 PG
MCHC RBC-ENTMCNC: 29.5 GM/DL
MCV RBC AUTO: 71.1 FL
MONOCYTES # BLD AUTO: 0.37 K/UL
MONOCYTES NFR BLD AUTO: 5.4 %
NEUTROPHILS # BLD AUTO: 3.54 K/UL
NEUTROPHILS NFR BLD AUTO: 51.9 %
PLATELET # BLD AUTO: 398 K/UL
RBC # BLD: 4.25 M/UL
RBC # FLD: 20.1 %
SURGICAL PATHOLOGY STUDY: SIGNIFICANT CHANGE UP
WBC # FLD AUTO: 6.83 K/UL

## 2022-11-09 PROCEDURE — 36415 COLL VENOUS BLD VENIPUNCTURE: CPT

## 2022-11-09 PROCEDURE — G0463: CPT

## 2022-11-10 ENCOUNTER — TRANSCRIPTION ENCOUNTER (OUTPATIENT)
Age: 47
End: 2022-11-10

## 2022-11-10 ENCOUNTER — RESULT REVIEW (OUTPATIENT)
Age: 47
End: 2022-11-10

## 2022-11-10 ENCOUNTER — OUTPATIENT (OUTPATIENT)
Dept: OUTPATIENT SERVICES | Facility: HOSPITAL | Age: 47
LOS: 1 days | End: 2022-11-10
Payer: COMMERCIAL

## 2022-11-10 ENCOUNTER — APPOINTMENT (OUTPATIENT)
Dept: SURGICAL ONCOLOGY | Facility: HOSPITAL | Age: 47
End: 2022-11-10

## 2022-11-10 VITALS
TEMPERATURE: 98 F | OXYGEN SATURATION: 100 % | WEIGHT: 198.86 LBS | SYSTOLIC BLOOD PRESSURE: 126 MMHG | DIASTOLIC BLOOD PRESSURE: 83 MMHG | HEIGHT: 62.5 IN | HEART RATE: 73 BPM | RESPIRATION RATE: 14 BRPM

## 2022-11-10 VITALS
SYSTOLIC BLOOD PRESSURE: 120 MMHG | HEART RATE: 68 BPM | RESPIRATION RATE: 14 BRPM | DIASTOLIC BLOOD PRESSURE: 69 MMHG | OXYGEN SATURATION: 96 % | TEMPERATURE: 98 F

## 2022-11-10 DIAGNOSIS — Z98.890 OTHER SPECIFIED POSTPROCEDURAL STATES: Chronic | ICD-10-CM

## 2022-11-10 DIAGNOSIS — Z90.49 ACQUIRED ABSENCE OF OTHER SPECIFIED PARTS OF DIGESTIVE TRACT: Chronic | ICD-10-CM

## 2022-11-10 DIAGNOSIS — O03.9 COMPLETE OR UNSPECIFIED SPONTANEOUS ABORTION WITHOUT COMPLICATION: Chronic | ICD-10-CM

## 2022-11-10 DIAGNOSIS — C50.919 MALIGNANT NEOPLASM OF UNSPECIFIED SITE OF UNSPECIFIED FEMALE BREAST: ICD-10-CM

## 2022-11-10 PROCEDURE — 88305 TISSUE EXAM BY PATHOLOGIST: CPT | Mod: 26

## 2022-11-10 PROCEDURE — 38525 BIOPSY/REMOVAL LYMPH NODES: CPT | Mod: LT

## 2022-11-10 PROCEDURE — 38525 BIOPSY/REMOVAL LYMPH NODES: CPT

## 2022-11-10 PROCEDURE — A9541: CPT

## 2022-11-10 PROCEDURE — 38900 IO MAP OF SENT LYMPH NODE: CPT | Mod: LT

## 2022-11-10 PROCEDURE — 38792 RA TRACER ID OF SENTINL NODE: CPT | Mod: LT,59

## 2022-11-10 PROCEDURE — 19301 PARTIAL MASTECTOMY: CPT | Mod: LT

## 2022-11-10 PROCEDURE — 38900 IO MAP OF SENT LYMPH NODE: CPT

## 2022-11-10 PROCEDURE — 88307 TISSUE EXAM BY PATHOLOGIST: CPT

## 2022-11-10 PROCEDURE — 88305 TISSUE EXAM BY PATHOLOGIST: CPT

## 2022-11-10 PROCEDURE — 88307 TISSUE EXAM BY PATHOLOGIST: CPT | Mod: 26

## 2022-11-10 PROCEDURE — 76098 X-RAY EXAM SURGICAL SPECIMEN: CPT | Mod: 26

## 2022-11-10 PROCEDURE — 76098 X-RAY EXAM SURGICAL SPECIMEN: CPT

## 2022-11-10 RX ORDER — ONDANSETRON 8 MG/1
4 TABLET, FILM COATED ORAL ONCE
Refills: 0 | Status: DISCONTINUED | OUTPATIENT
Start: 2022-11-10 | End: 2022-11-10

## 2022-11-10 RX ORDER — SODIUM CHLORIDE 9 MG/ML
1000 INJECTION, SOLUTION INTRAVENOUS
Refills: 0 | Status: DISCONTINUED | OUTPATIENT
Start: 2022-11-10 | End: 2022-11-10

## 2022-11-10 RX ORDER — HYDROMORPHONE HYDROCHLORIDE 2 MG/ML
0.5 INJECTION INTRAMUSCULAR; INTRAVENOUS; SUBCUTANEOUS
Refills: 0 | Status: DISCONTINUED | OUTPATIENT
Start: 2022-11-10 | End: 2022-11-10

## 2022-11-10 RX ORDER — MECLIZINE HCL 12.5 MG
1 TABLET ORAL
Qty: 0 | Refills: 0 | DISCHARGE

## 2022-11-10 RX ORDER — AMOXICILLIN 250 MG/5ML
1 SUSPENSION, RECONSTITUTED, ORAL (ML) ORAL
Qty: 0 | Refills: 0 | DISCHARGE

## 2022-11-10 RX ADMIN — SODIUM CHLORIDE 50 MILLILITER(S): 9 INJECTION, SOLUTION INTRAVENOUS at 07:56

## 2022-11-10 RX ADMIN — HYDROMORPHONE HYDROCHLORIDE 0.5 MILLIGRAM(S): 2 INJECTION INTRAMUSCULAR; INTRAVENOUS; SUBCUTANEOUS at 11:45

## 2022-11-10 RX ADMIN — HYDROMORPHONE HYDROCHLORIDE 0.5 MILLIGRAM(S): 2 INJECTION INTRAMUSCULAR; INTRAVENOUS; SUBCUTANEOUS at 12:21

## 2022-11-10 NOTE — ASU DISCHARGE PLAN (ADULT/PEDIATRIC) - NS MD DC FALL RISK RISK
For information on Fall & Injury Prevention, visit: [email or web address] OR  [email or web address] OR  https://www.cdc.gov/steadi/patient.html

## 2022-11-10 NOTE — ASU DISCHARGE PLAN (ADULT/PEDIATRIC) - CARE PROVIDER_API CALL
Suzan Troncoso)  Surgery  [address] [place] [id number]  Phone: [phone]  Fax: [phone]  Follow Up Time: 2 weeks

## 2022-11-10 NOTE — ASU PATIENT PROFILE, ADULT - NSICDXPASTSURGICALHX_GEN_ALL_CORE_FT
PAST SURGICAL HISTORY:       H/O breast biopsy bilateral 21 radiology    H/O umbilical hernia repair     History of D&C     S/P cholecystectomy with hernia repair 21 - [facility]

## 2022-11-10 NOTE — ASU DISCHARGE PLAN (ADULT/PEDIATRIC) - ASU DC SPECIAL INSTRUCTIONSFT
follow your surgeons instructions  follow up with your surgeon within 2 weeks  Alternate between Tylenol and Motrin for pain medications

## 2022-11-10 NOTE — ASU PATIENT PROFILE, ADULT - FALL HARM RISK - UNIVERSAL INTERVENTIONS
Bed in lowest position, wheels locked, appropriate side rails in place/Call bell, personal items and telephone in reach/Instruct patient to call for assistance before getting out of bed or chair/Non-slip footwear when patient is out of bed/[place] to call system/Physically safe environment - no spills, clutter or unnecessary equipment/Purposeful Proactive Rounding/Room/bathroom lighting operational, light cord in reach

## 2022-11-14 ENCOUNTER — APPOINTMENT (OUTPATIENT)
Dept: MRI IMAGING | Facility: CLINIC | Age: 47
End: 2022-11-14

## 2022-11-16 LAB — SURGICAL PATHOLOGY STUDY: SIGNIFICANT CHANGE UP

## 2022-11-22 ENCOUNTER — APPOINTMENT (OUTPATIENT)
Dept: SURGICAL ONCOLOGY | Facility: CLINIC | Age: 47
End: 2022-11-22

## 2022-11-22 VITALS
TEMPERATURE: 97.6 F | RESPIRATION RATE: 18 BRPM | DIASTOLIC BLOOD PRESSURE: 85 MMHG | HEART RATE: 74 BPM | OXYGEN SATURATION: 99 % | WEIGHT: 205 LBS | BODY MASS INDEX: 37.5 KG/M2 | SYSTOLIC BLOOD PRESSURE: 127 MMHG

## 2022-11-22 PROCEDURE — 99024 POSTOP FOLLOW-UP VISIT: CPT

## 2022-11-22 NOTE — HISTORY OF PRESENT ILLNESS
[FreeTextEntry1] : The patient is a 47 year F referred by Dr. Sherie Mcgregor for consultation regarding L IDC w/ tubular features s/p L lumpectomy and L SLNB on 11/10/2022\par \par \par Patient previously had B/L USG-CNB in 5/2021 revealing R 9:00 PASH and L 10:00 fibroadenomatoid and PASH, concordant. Radiology recommended f/u imaging in 6 months. She denies any breast symptoms prior to her imaging this year.\par \par Most recent imaging:\par 9/27/2022 B/L DM (NW) TC 12.% revealed heterogeneously dense breasts \par - L UOQ architectural distortion -> Stereo\par - B/L previously bx masses are not significantly changed and contain bx markers\par - Nodular parenchymal pattern\par \par 9/27/2022 B/L US\par - R 9:00 N14 2.1 x 1.5 x 3 cm heterogenous mass, stable from 5/2021 (previously bx PASH). W/in this mass there is a 0.9 x 0.7 x 1 cm cyst.\par - R UOQ multiple cysts and cyst clusters ranging in size measuring up to 3 mm\par - L 10:00 N8 1.8 x 1.2 x 2.3 cm hypoechoic mass (previously bx yielding fibroadenomatoid nodule and PASH) unchanged from 5/2021.\par - BR 4B\par \par 10/3/2022 L Stereo \par - L UOQ (tophat): Tubular carcinoma, G1, 0.3 cm. Calcs and necrosis absent. DCIS, cribriform, low grade atypia present w/ microcalcs. Smooth muscle myosin heavy chain and P63 show absence of myoepithelial cells in the infiltrative glands, support histologic dx of tubular carcinoma. IDP w/ microcalcs.  \par - %, NM 90% strong, HER2 2+ Equivocal, CISH neg\par *Corrected: IDC w/ tubular features, G1, 0.3 cm. ADH w/ calcs, cannot exclude DCIS; FEA; IDP. \par \par She reports anemia from fibroids. She is scheduled for a myomectomy in Dec 2022.\par Patient's past surgical history include D&C, hysteroscopy w/ poly removal, B/L breast biopsies (5/2021).\par She takes no medications.\par Patient has no family history of breast cancer, or other cancers.\par \par 10/19/2022 [facility] GT (9 gene panel): negative\par \par 10/24/2022 MRI (NW)\par - R LOQ bx marker w/in circumscribed enhancing mass (previously bx PASH)\par - R upper central 1.7 cm in AP dimension circumscribed T2 hyperintense enhancing mass demonstrating progressive kinetics -> MR BX if it alters treatment plan\par - L 1.8 x 2 x 2.6 cm clumped NME-> wide excision \par - L UIQ 2.4 cm circumscribed mass containing bx marker (yielding PASH)\par - BR4A\par \par 11/10/2022 L lumpectomy and L SLNB\par - Tubular carcinoma, [place] grade 1, 1.8 cm. DCIS, nuclear grade 2, cribriform, flat and micropapillary types with calcifications. The invasive tumor is admixed with the invasive component and constitutes less than 5% of the total tumor volume. No LVI\par - Margins negative\par - 8 SLN negative (0/8)\par -  %, NM 90% strong, HER2 2+ Equivocal, CISH neg\par - [id number], AJCC stage IA\par \par Interval History (postop): \par The patient reports that she is feeling well. Notes some numbness and feeling "funny" on the back of her left arm. Denies any arm swelling. Denies any breast pain.

## 2022-11-22 NOTE — DATA REVIEWED
[FreeTextEntry1] : 11/9/2022 L stereo (corrected)\par 10/19/2022 [facility] GT\par 10/24/2022 MRI \par

## 2022-11-22 NOTE — ASSESSMENT
[FreeTextEntry1] : The patient is a 47 year F referred by Dr. Sherie Mcgregor for consultation regarding L IDC w/ tubular features %, [place] 90% strong, HER2 Equivocal, CISH neg\par \par 11/10/2022 L lumpectomy and L SLNB\par - Tubular carcinoma, [place] grade 1, 1.8 cm. DCIS, nuclear grade 2, cribriform, flat and micropapillary types with calcifications. The invasive tumor is admixed with the invasive component and constitutes less than 5% of the total tumor volume. No LVI\par - Margins negative\par - 8 SLN negative (0/8)\par -  %, [place] 90% strong, HER2 2+ Equivocal, CISH neg\par - [id number], AJCC stage IA\par   \par Exam today shows a well-healing incision, no evidence of infection, hematoma or seroma.\par \par \par Plan:\par - Oncotype pending\par - R DM/US 4/2023 for R breast mass \par - Refer to med onc and rad onc\par - RTO 3 months\par \par \par

## 2022-11-22 NOTE — PHYSICAL EXAM
[Normocephalic] : normocephalic [Atraumatic] : atraumatic [EOMI] : extra ocular movement intact [PERRL] : pupils equal, round and reactive to light [Sclera nonicteric] : sclera nonicteric [Symmetrical] : symmetrical [Bra Size: ___] : Bra Size: [unfilled] [Grade 2] : Ptosis Grade 2 [Breast Mass Right Breast ___cm] : no masses [Breast Mass Left Breast ___cm] : no masses [No Edema] : no edema [No Rashes] : no rashes [No Ulceration] : no ulceration [Breast Nipple Inversion] : nipples not inverted [Breast Nipple Retraction] : nipples not retracted [Breast Nipple Flattening] : nipples not flattened [de-identified] : non-labored respirations  [de-identified] : Left nipple with cleft, longstanding per pt [de-identified] : low axillary transverse incision healing well, no erythema, no fluctuance, some underlying induration

## 2022-11-22 NOTE — CONSULT LETTER
[Dear  ___] : Dear  [unfilled], [Courtesy Letter:] : I had the pleasure of seeing your patient, [unfilled], in my office today. [Please see my note below.] : Please see my note below. [Consult Closing:] : Thank you very much for allowing me to participate in the care of this patient.  If you have any questions, please do not hesitate to contact me. [Sincerely,] : Sincerely, [FreeTextEntry3] : Suzan Troncoso MD\par Breast Surgeon\par Division of Surgical Oncology\par Department of Surgery\par [address] [place] [address]\[place] [address] \par Tel: [phone]\par Fax: [phone]\par Email: [email or web address]

## 2022-11-30 ENCOUNTER — APPOINTMENT (OUTPATIENT)
Dept: OBGYN | Facility: CLINIC | Age: 47
End: 2022-11-30

## 2022-12-15 ENCOUNTER — OUTPATIENT (OUTPATIENT)
Dept: OUTPATIENT SERVICES | Facility: HOSPITAL | Age: 47
LOS: 1 days | Discharge: HOME | End: 2022-12-15

## 2022-12-15 DIAGNOSIS — Z98.890 OTHER SPECIFIED POSTPROCEDURAL STATES: Chronic | ICD-10-CM

## 2022-12-15 DIAGNOSIS — R92.8 OTHER ABNORMAL AND INCONCLUSIVE FINDINGS ON DIAGNOSTIC IMAGING OF BREAST: ICD-10-CM

## 2022-12-15 DIAGNOSIS — Z90.49 ACQUIRED ABSENCE OF OTHER SPECIFIED PARTS OF DIGESTIVE TRACT: Chronic | ICD-10-CM

## 2022-12-15 DIAGNOSIS — O03.9 COMPLETE OR UNSPECIFIED SPONTANEOUS ABORTION WITHOUT COMPLICATION: Chronic | ICD-10-CM

## 2022-12-16 ENCOUNTER — APPOINTMENT (OUTPATIENT)
Dept: OBGYN | Facility: HOSPITAL | Age: 47
End: 2022-12-16

## 2022-12-27 ENCOUNTER — RESULT REVIEW (OUTPATIENT)
Age: 47
End: 2022-12-27

## 2022-12-27 ENCOUNTER — APPOINTMENT (OUTPATIENT)
Dept: HEMATOLOGY ONCOLOGY | Facility: CLINIC | Age: 47
End: 2022-12-27

## 2022-12-27 ENCOUNTER — OUTPATIENT (OUTPATIENT)
Dept: OUTPATIENT SERVICES | Facility: HOSPITAL | Age: 47
LOS: 1 days | Discharge: HOME | End: 2022-12-27
Payer: COMMERCIAL

## 2022-12-27 ENCOUNTER — APPOINTMENT (OUTPATIENT)
Dept: RADIATION ONCOLOGY | Facility: CLINIC | Age: 47
End: 2022-12-27

## 2022-12-27 VITALS
HEART RATE: 70 BPM | TEMPERATURE: 97.7 F | SYSTOLIC BLOOD PRESSURE: 118 MMHG | HEIGHT: 62 IN | DIASTOLIC BLOOD PRESSURE: 82 MMHG | OXYGEN SATURATION: 100 % | BODY MASS INDEX: 37.08 KG/M2 | WEIGHT: 201.5 LBS | RESPIRATION RATE: 17 BRPM

## 2022-12-27 VITALS
OXYGEN SATURATION: 99 % | WEIGHT: 200.62 LBS | DIASTOLIC BLOOD PRESSURE: 78 MMHG | RESPIRATION RATE: 18 BRPM | TEMPERATURE: 97.2 F | BODY MASS INDEX: 36.45 KG/M2 | SYSTOLIC BLOOD PRESSURE: 121 MMHG | HEART RATE: 79 BPM | HEIGHT: 62.2 IN

## 2022-12-27 DIAGNOSIS — Z98.890 OTHER SPECIFIED POSTPROCEDURAL STATES: Chronic | ICD-10-CM

## 2022-12-27 DIAGNOSIS — O03.9 COMPLETE OR UNSPECIFIED SPONTANEOUS ABORTION WITHOUT COMPLICATION: Chronic | ICD-10-CM

## 2022-12-27 DIAGNOSIS — N92.6 IRREGULAR MENSTRUATION, UNSPECIFIED: ICD-10-CM

## 2022-12-27 DIAGNOSIS — Z90.49 ACQUIRED ABSENCE OF OTHER SPECIFIED PARTS OF DIGESTIVE TRACT: Chronic | ICD-10-CM

## 2022-12-27 LAB
ALBUMIN SERPL ELPH-MCNC: 4.2 G/DL
ALP BLD-CCNC: 62 U/L
ALT SERPL-CCNC: 9 U/L
ANION GAP SERPL CALC-SCNC: 10 MMOL/L
AST SERPL-CCNC: 12 U/L
BASOPHILS # BLD AUTO: 0.03 K/UL — SIGNIFICANT CHANGE UP (ref 0–0.2)
BASOPHILS NFR BLD AUTO: 0.4 % — SIGNIFICANT CHANGE UP (ref 0–2)
BILIRUB SERPL-MCNC: 0.5 MG/DL
BUN SERPL-MCNC: 14 MG/DL
CALCIUM SERPL-MCNC: 9.4 MG/DL
CHLORIDE SERPL-SCNC: 108 MMOL/L
CO2 SERPL-SCNC: 24 MMOL/L
CREAT SERPL-MCNC: 0.75 MG/DL
EGFR: 100 ML/MIN/1.73M2
EOSINOPHIL # BLD AUTO: 0.11 K/UL — SIGNIFICANT CHANGE UP (ref 0–0.5)
EOSINOPHIL NFR BLD AUTO: 1.6 % — SIGNIFICANT CHANGE UP (ref 0–6)
FSH SERPL-MCNC: 3 IU/L
GLUCOSE SERPL-MCNC: 91 MG/DL
HCT VFR BLD CALC: 29.2 % — LOW (ref 34.5–45)
HGB BLD-MCNC: 8.6 G/DL — LOW (ref 11.5–15.5)
IMM GRANULOCYTES NFR BLD AUTO: 0.3 % — SIGNIFICANT CHANGE UP (ref 0–0.9)
LH SERPL-ACNC: 1.3 IU/L
LYMPHOCYTES # BLD AUTO: 2.39 K/UL — SIGNIFICANT CHANGE UP (ref 1–3.3)
LYMPHOCYTES # BLD AUTO: 34.4 % — SIGNIFICANT CHANGE UP (ref 13–44)
MCHC RBC-ENTMCNC: 21.4 PG — LOW (ref 27–34)
MCHC RBC-ENTMCNC: 29.5 G/DL — LOW (ref 32–36)
MCV RBC AUTO: 72.8 FL — LOW (ref 80–100)
MONOCYTES # BLD AUTO: 0.36 K/UL — SIGNIFICANT CHANGE UP (ref 0–0.9)
MONOCYTES NFR BLD AUTO: 5.2 % — SIGNIFICANT CHANGE UP (ref 2–14)
NEUTROPHILS # BLD AUTO: 4.03 K/UL — SIGNIFICANT CHANGE UP (ref 1.8–7.4)
NEUTROPHILS NFR BLD AUTO: 58.1 % — SIGNIFICANT CHANGE UP (ref 43–77)
NRBC # BLD: 0 /100 WBCS — SIGNIFICANT CHANGE UP (ref 0–0)
PLATELET # BLD AUTO: 356 K/UL — SIGNIFICANT CHANGE UP (ref 150–400)
POTASSIUM SERPL-SCNC: 4 MMOL/L
PROT SERPL-MCNC: 7.1 G/DL
RBC # BLD: 4.01 M/UL — SIGNIFICANT CHANGE UP (ref 3.8–5.2)
RBC # FLD: 20.5 % — HIGH (ref 10.3–14.5)
SODIUM SERPL-SCNC: 142 MMOL/L
WBC # BLD: 6.94 K/UL — SIGNIFICANT CHANGE UP (ref 3.8–10.5)
WBC # FLD AUTO: 6.94 K/UL — SIGNIFICANT CHANGE UP (ref 3.8–10.5)

## 2022-12-27 PROCEDURE — 77263 THER RADIOLOGY TX PLNG CPLX: CPT

## 2022-12-27 PROCEDURE — 99204 OFFICE O/P NEW MOD 45 MIN: CPT | Mod: GC,25

## 2022-12-27 PROCEDURE — 99205 OFFICE O/P NEW HI 60 MIN: CPT

## 2022-12-27 RX ORDER — METRONIDAZOLE 7.5 MG/G
0.75 GEL VAGINAL
Qty: 1 | Refills: 1 | Status: DISCONTINUED | COMMUNITY
Start: 2022-10-26 | End: 2022-12-27

## 2022-12-27 RX ORDER — CLINDAMYCIN HYDROCHLORIDE 150 MG/1
150 CAPSULE ORAL
Qty: 15 | Refills: 0 | Status: DISCONTINUED | COMMUNITY
Start: 2022-10-26 | End: 2022-12-27

## 2022-12-28 ENCOUNTER — NON-APPOINTMENT (OUTPATIENT)
Age: 47
End: 2022-12-28

## 2022-12-28 NOTE — HISTORY OF PRESENT ILLNESS
[FreeTextEntry1] : Ms. Gandhi is a 48 yo female with newly diagnosed left invasive ductal carcinoma, who is s/p lumpectomy and presents for consult for radiation recommendations.\par \par She was undergoing routine screening mammography. A prior mammogram was from 2016. Screening mammography from 4/8/21 showed interval increase in size of 3 right breast focal asymmetries, located in the outer/central outer/upper inner locations; as well as an asymmetry in the upper inner left breast. Diagnostic mammogram on 5/6/21 showed interval increase in circumscribed mass in the left upper central inner breast and the right lateral posterior breast. The other two right breast asymmetries were less conspicuous. Breast ultrasound showed a heterogeneous hypoechoic mass in the right breast at 9:00, 14 cm FN, measuring 3 x 8 x 2.3 cm corresponding to the mammographic finding; and a hypoechoic mass in the left breast at 10:00, 8 cm FN measuring 2 x 1.3 x 2.6 cm corresponding to the mammographic finding. Ultrasound guided biopsies showed PASH, adenosis and mild usual ductal hyperplasia from the right breast mass and a fibroadenomatoid nodule and PASH from the left breast mass.  \par \par The next screening mammogram on 9/27/22 showed architectural distortion in the upper outer left breast. The previously biopsies masses were not significantly changed. Breast ultrasound showed stability of the previously biopsied hypoechoic masses and no suspicious findings. \par \par Stereotactic core biopsy of the left breast mass on 10/3/22 showed tubular carcinoma, [place] grade 1, measuring 0.3 cm in greatest dimension. DCIS with low grade nuclear atypia was also present. Intraductal papilloma with microcalcifications was also present. The tumor was %, [place] 90% and HER 2 equivocal 2+, CISH non-amplified. \par \par [facility] genetic testing from 10/19/22 with Breast Cancer STAT panel plus SHLOMO and CHECK2, showed no deleterious mutations. \par \par Breast MRI on 10/24/22 showed clumped nonmass enhancement measuring 1.8 x 2.0 x 2.6 cm. A circumscribed mass measuring 2.4 cm in the upper inner left breast with biopsy marker was also present. In the right breast, a circumscribed enhancement mass in the lower outer right breast with biopsy marker and an additional enhancing mass was noted in the upper central right breast. \par \par She underwent lumpectomy and sentinel lymph node biopsy on 11/10/22. Pathology showed tubular carcinoma, [place] grade 1, measuring 1.8 cm in greatest dimension. DCIS with nuclear grade 2, was also present, cribriform/flat/micropapillary types, non-extensive. There was no lymphvascular invasion. Margins on the lumpectomy specimen were negative. Additional shaved margins contained benign breast tissue. [place] lymph node biopsy yielded eight negative lymph nodes. Oncotype Dx recurrence score was 13. \par \par Ms. Gandhi is feeling generally well. She reports swelling in her left upper arm since the surgery and some limitation of ROM.

## 2022-12-28 NOTE — CONSULT LETTER
[Dear  ___] : Dear  [unfilled], [Consult Letter:] : I had the pleasure of evaluating your patient, [unfilled]. [Please see my note below.] : Please see my note below. [Consult Closing:] : Thank you very much for allowing me to participate in the care of this patient.  If you have any questions, please do not hesitate to contact me. [Sincerely,] : Sincerely, [FreeTextEntry3] : Musa Lauren MD\par \par Division of Hematology/Oncology\par [facility]\par [address]\[place] [address] \par Tel: [phone]\par Fax: [phone]\par Email: [email or web address]

## 2022-12-28 NOTE — ASSESSMENT
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 Lainez; having simulation next week with plan to initiate in about 2 weeks; total of 4 weeks of therapy per patient\par - advised patient that after completing RT, would recommend initiation of adjuvant endocrine therapy with either tamoxifen 20mg daily for 7-10 years or ovarian suppression and use of aromatase inhibitor for 7-10 years; \par - would avoid taking tranhexamic acid which has been prescribed to her by OB-GYN given thrombotic risk of tamoxifen\par - risk/benefits/side effects of tamoxifen including but not limited to increased risk of endometrial cancer, increased risk of thrombosis and cataracts discussed with patient\par - risk/benefits/side effects of AIs including but not limited to arthralgia, myalagia, osteoporosis and hyperlipidemia discussed with patient\par - she is apprehensive about making decision about endocrine therapy at this time; advised okay to wait as she will be undergoing radiation therapy for the next few weeks\par - will check chem panel and LH/FSH/estradiol level to determine menopausal status\par - also discussed with patient that her anemia, while most likely related to heavy menstrual bleeding, may need additional evaluation by GI as she is of screening age for colonoscopy at 47 years old\par - Patient had the opportunity to have all their questions answered to their satisfaction \par - f/u in about 6 weeks\par

## 2022-12-28 NOTE — REASON FOR VISIT
[Initial Consultation] : an initial consultation [Friend] : friend [FreeTextEntry2] : Localized left breast cancer

## 2022-12-28 NOTE — HISTORY OF PRESENT ILLNESS
[Disease: _____________________] : Disease: [unfilled] [T: ___] : T[unfilled] [N: ___] : N[unfilled] [M: ___] : M[unfilled] [AJCC Stage: ____] : AJCC Stage: [unfilled] [de-identified] : Patient who in 2021 was noted onto have Right breast PASH at 9:00 and Left Breast fibroadenomatoid and PASH at 10:00, concordant, undergoing f/u imaging in 6 months. She denies any breast symptoms prior to her imaging this year.\par \par Most recent imaging [id number]:\par 2022 B/L Diagnostic mammo (NW) TC 12.% revealed heterogeneously dense breasts \par - L UOQ architectural distortion -> Stereotactic biopsy recommended\par - B/L previously bx masses are not significantly changed and contain bx markers\par - Nodular parenchymal pattern\par \par 2022 B/L US\par - R 9:00 N14 2.1 x 1.5 x 3 cm heterogenous mass, stable from 2021 (previously bx PASH). 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 Calcs and necrosis absent. DCIS, cribriform, low grade atypia present w/ microcalcs. Smooth muscle myosin heavy chain and P63 show absence of myoepithelial cells in the infiltrative glands, support histologic dx of tubular carcinoma. IDP w/ microcalcs. \par - %, [place] 90% strong, HER2 2+ Equivocal, CISH negative\par \par Preoperative MRI on 10/24/22 showed biopsy proven malignancy in Upper outer quadrant of the left breast (2.6cm); recommended wide excsision. T2 hyperintese cicumscribed mass in upper central right breast with similar appearance to other biopsy proven PASH was also noted.\par \par Genetic Testing with [facility] - negative\par \par On 11/10/22 she underwent Left breast lumpectomy and SLNB; Final Path Tubular carcinoma, [place] grade 4/9, 1.8cm in largest dimension; additionally DCIS with flat and micropappillary features was noted; all margins negative, 2 SLN removed and negative; Oncotype recurrence score 13\par \par \par Patient has also been found to have iron deficiency anemia as per her primary care physican and has started on IV iron infusions as of last week. Is suspected to have iron deficiency due to heavy menstrual periods as result of fibroids; has not had GI evaluation\par \par She was seen by Radiation Oncology earlier this morning for evaluation and is planned for adjuvant RT.\par Presents for Medical Oncology evaluation\par \par \par GYN history Menarche at 13, having heavy menstrual periods with LMP 12/10/22, , 1 miscarriage; 1st full term pregnancy at age 24; did not breastfeed  [de-identified] : %, PR90%, [id number] equivocal by IHC, CISH negative

## 2022-12-28 NOTE — VITALS
[Maximal Pain Intensity: 7/10] : 7/10 [Least Pain Intensity: 0/10] : 0/10 [Pain Description/Quality: ___] : Pain description/quality: [unfilled] [Pain Duration: ___] : Pain duration: [unfilled] [Pain Location: ___] : Pain Location: [unfilled] [Pain Interferes with ADLs] : Pain interferes with activities of daily living. [NoTreatment Scheduled] : no treatment scheduled [90: Able to carry normal activity; minor signs or symptoms of disease.] : 90: Able to carry normal activity; minor signs or symptoms of disease.  [Date: ____________] : Patient's last distress assessment performed on [unfilled]. [5 - Distress Level] : Distress Level: 5 [FreeTextEntry7] : Pt refused referral

## 2022-12-28 NOTE — PHYSICAL EXAM
[Heart Rate And Rhythm] : heart rate and rhythm were normal [Arterial Pulses Normal] : the arterial pulses were normal [Sclera] : the sclera and conjunctiva were normal [Outer Ear] : the ears and nose were normal in appearance [] : no respiratory distress [Breast Abnormal Lactation (Galactorrhea)] : no nipple discharge [Abdomen Soft] : soft [Nondistended] : nondistended [Axillary Lymph Nodes Enlarged Bilaterally] : axillary [Normal] : oriented to person, place and time, the affect was normal, the mood was normal and not anxious [de-identified] : Left lumpectomy/axillary scar is well healed, no erythema, mild LUE edema [de-identified] : mild LUE limitation in ROM

## 2022-12-28 NOTE — LETTER CLOSING
[Consult Closing:] : Thank you for allowing me to participate in the care of this patient.  If you have any questions, please do not hesitate to contact me. [Sincerely yours,] : Sincerely yours, [FreeTextEntry3] : Nini Lainez MD\par

## 2022-12-28 NOTE — REVIEW OF SYSTEMS
[Night Sweats] : night sweats [Fatigue] : fatigue [SOB on Exertion] : shortness of breath during exertion [Urinary Frequency] : urinary frequency [Dizziness] : dizziness [Negative] : Heme/Lymph [Recent Change In Weight] : ~T no recent weight change [Shortness Of Breath] : no shortness of breath [Wheezing] : no wheezing [Cough] : no cough [Nocturia] : no nocturia [Vaginal Discharge] : no vaginal discharge [Dysmenorrhea/Abn Vaginal Bleeding] : no dysmenorrhea/abnormal vaginal bleeding [Confused] : no confusion [Fainting] : no fainting [FreeTextEntry2] : Pt anemic  [de-identified] : Pt has vertigo  [FreeTextEntry1] : NKDA

## 2022-12-28 NOTE — PHYSICAL EXAM
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

## 2023-01-02 LAB — ESTRADIOL ULTRASENSITIVE: 59.3 PG/ML

## 2023-01-03 ENCOUNTER — NON-APPOINTMENT (OUTPATIENT)
Age: 48
End: 2023-01-03

## 2023-01-03 ENCOUNTER — APPOINTMENT (OUTPATIENT)
Dept: OBGYN | Facility: CLINIC | Age: 48
End: 2023-01-03

## 2023-01-03 PROCEDURE — 77333 RADIATION TREATMENT AID(S): CPT | Mod: 26,59

## 2023-01-03 PROCEDURE — 77290 THER RAD SIMULAJ FIELD CPLX: CPT | Mod: 26

## 2023-01-03 PROCEDURE — 77334 RADIATION TREATMENT AID(S): CPT | Mod: 26

## 2023-01-13 PROCEDURE — 77300 RADIATION THERAPY DOSE PLAN: CPT | Mod: 26

## 2023-01-13 PROCEDURE — 77334 RADIATION TREATMENT AID(S): CPT | Mod: 26

## 2023-01-13 PROCEDURE — 77295 3-D RADIOTHERAPY PLAN: CPT | Mod: 26

## 2023-01-18 PROCEDURE — 77280 THER RAD SIMULAJ FIELD SMPL: CPT | Mod: 26

## 2023-01-23 ENCOUNTER — NON-APPOINTMENT (OUTPATIENT)
Age: 48
End: 2023-01-23

## 2023-01-24 ENCOUNTER — NON-APPOINTMENT (OUTPATIENT)
Age: 48
End: 2023-01-24

## 2023-01-25 PROCEDURE — 77427 RADIATION TX MANAGEMENT X5: CPT

## 2023-01-25 NOTE — HISTORY OF PRESENT ILLNESS
[FreeTextEntry1] : Ms. Gandhi is a 48 yo pre-menopausal female with Stage IA [id number] grade 1 tubular carcinoma of the left breast, %, [place] 90%, HER2 negative and Oncotype Dx 13. She underwent lumpectomy with negative margins and had a negative sentinel node biopsy. She is now receiving radiation therapy to the breast. \par \par She is feeling generally well. She denies fatigue and pain. She has not noted any skin reactions. She is using Aquaphor on the skin.

## 2023-01-25 NOTE — DISEASE MANAGEMENT
[Pathological] : TNM Stage: p [IA] : IA [TTNM] : 1c [NTNM] : 0 [MTNM] : x [de-identified] : 1060 cGy [de-identified] : 5240 cGy [de-identified] : Left breast

## 2023-01-25 NOTE — PHYSICAL EXAM
[Normal] : well developed, well nourished, in no acute distress [de-identified] : left lumpectomy/axillary scar is well healed, no erythema.

## 2023-01-25 NOTE — VITALS
[90: Able to carry normal activity; minor signs or symptoms of disease.] : 90: Able to carry normal activity; minor signs or symptoms of disease.  [Maximal Pain Intensity: 0/10] : 0/10

## 2023-02-01 ENCOUNTER — NON-APPOINTMENT (OUTPATIENT)
Age: 48
End: 2023-02-01

## 2023-02-02 ENCOUNTER — NON-APPOINTMENT (OUTPATIENT)
Age: 48
End: 2023-02-02

## 2023-02-02 PROCEDURE — 77427 RADIATION TX MANAGEMENT X5: CPT

## 2023-02-03 PROCEDURE — 77280 THER RAD SIMULAJ FIELD SMPL: CPT | Mod: 26

## 2023-02-08 ENCOUNTER — NON-APPOINTMENT (OUTPATIENT)
Age: 48
End: 2023-02-08

## 2023-02-08 NOTE — PHYSICAL EXAM
[Normal] : well developed, well nourished, in no acute distress [de-identified] : left lumpectomy/axillary scar is well healed. mild hyperpigmentation.

## 2023-02-08 NOTE — HISTORY OF PRESENT ILLNESS
[FreeTextEntry1] : Ms. Gandhi is a 46 yo pre-menopausal female with Stage IA [id number] grade 1 tubular carcinoma of the left breast, %, [place] 90%, HER2 negative and Oncotype Dx 13. She underwent lumpectomy with negative margins and had a negative sentinel node biopsy. She is now receiving radiation therapy to the breast. \par \par She is feeling generally well. She denies fatigue and pain. She denies noticeable skin reaction.

## 2023-02-08 NOTE — DISEASE MANAGEMENT
[Pathological] : TNM Stage: p [IA] : IA [TTNM] : 1c [NTNM] : 0 [MTNM] : x [de-identified] : 4805 cGy [de-identified] : 5240 cGy [de-identified] : Left breast

## 2023-02-09 ENCOUNTER — APPOINTMENT (OUTPATIENT)
Dept: HEMATOLOGY ONCOLOGY | Facility: CLINIC | Age: 48
End: 2023-02-09

## 2023-02-09 PROCEDURE — 77427 RADIATION TX MANAGEMENT X5: CPT

## 2023-02-15 ENCOUNTER — NON-APPOINTMENT (OUTPATIENT)
Age: 48
End: 2023-02-15

## 2023-02-17 PROCEDURE — 77427 RADIATION TX MANAGEMENT X5: CPT

## 2023-02-17 NOTE — HISTORY OF PRESENT ILLNESS
[FreeTextEntry1] : Ms. Gandhi is a 46 yo pre-menopausal female with Stage IA [id number] grade 1 tubular carcinoma of the left breast, %, [place] 90%, HER2 negative and Oncotype Dx 13. She underwent lumpectomy with negative margins and had a negative sentinel node biopsy. She is now receiving radiation therapy to the breast. \par \par She is feeling generally well. She denies fatigue and pain. She reports some soreness of the breast, with some burning localized to the skin in the fold underneath her breast.

## 2023-02-17 NOTE — PHYSICAL EXAM
[Normal] : no joint swelling, no clubbing or cyanosis of the fingernails and muscle strength and tone were normal [de-identified] : left lumpectomy/axillary scar is well healed. in medial aspect of left breast inferior skin fold has an area of desquamation. In rest of the inferior skin fold has increased hyperpigmentation

## 2023-02-17 NOTE — DISEASE MANAGEMENT
[Pathological] : TNM Stage: p [IA] : IA [TTNM] : 1c [NTNM] : 0 [MTNM] : x [de-identified] : 3710 cGy [de-identified] : 5240 cGy [de-identified] : Left breast

## 2023-02-19 NOTE — DISEASE MANAGEMENT
[Pathological] : TNM Stage: p [IA] : IA [TTNM] : 1c [NTNM] : 0 [MTNM] : x [de-identified] : 0211 cGy [de-identified] : 5240 cGy [de-identified] : Left breast

## 2023-02-19 NOTE — VITALS
[Maximal Pain Intensity: 7/10] : 7/10 [80: Normal activity with effort; some signs or symptoms of disease.] : 80: Normal activity with effort; some signs or symptoms of disease.

## 2023-02-19 NOTE — HISTORY OF PRESENT ILLNESS
[FreeTextEntry1] : Ms. Gandhi is a 48 yo pre-menopausal female with Stage IA [id number] grade 1 tubular carcinoma of the left breast, %, [place] 90%, HER2 negative and Oncotype Dx 13. She underwent lumpectomy with negative margins and had a negative sentinel node biopsy. She is now receiving radiation therapy to the breast. \par \par She is feeling generally well. She denies fatigue and pain. She reports some soreness of the breast, with some burning localized to the skin in the fold underneath her breast. Skin reaction is improved, Biafine provides relief. \par \par

## 2023-02-19 NOTE — PHYSICAL EXAM
[Normal] : well developed, well nourished, in no acute distress [de-identified] : left lumpectomy/axillary scar is well healed. in medial aspect of left breast inferior skin fold has an area of desquamation. In rest of the inferior skin fold has increased hyperpigmentation

## 2023-02-28 ENCOUNTER — APPOINTMENT (OUTPATIENT)
Dept: SURGICAL ONCOLOGY | Facility: CLINIC | Age: 48
End: 2023-02-28
Payer: COMMERCIAL

## 2023-02-28 ENCOUNTER — APPOINTMENT (OUTPATIENT)
Dept: OBGYN | Facility: CLINIC | Age: 48
End: 2023-02-28
Payer: COMMERCIAL

## 2023-02-28 ENCOUNTER — APPOINTMENT (OUTPATIENT)
Dept: OBGYN | Facility: CLINIC | Age: 48
End: 2023-02-28

## 2023-02-28 VITALS
SYSTOLIC BLOOD PRESSURE: 129 MMHG | BODY MASS INDEX: 37.8 KG/M2 | DIASTOLIC BLOOD PRESSURE: 80 MMHG | HEART RATE: 87 BPM | TEMPERATURE: 97.5 F | RESPIRATION RATE: 18 BRPM | WEIGHT: 208 LBS | OXYGEN SATURATION: 98 %

## 2023-02-28 DIAGNOSIS — R92.2 INCONCLUSIVE MAMMOGRAM: ICD-10-CM

## 2023-02-28 PROCEDURE — 99213 OFFICE O/P EST LOW 20 MIN: CPT

## 2023-02-28 PROCEDURE — 99213 OFFICE O/P EST LOW 20 MIN: CPT | Mod: 95

## 2023-02-28 NOTE — CONSULT LETTER
[Dear  ___] : Dear  [unfilled], [Courtesy Letter:] : I had the pleasure of seeing your patient, [unfilled], in my office today. [Please see my note below.] : Please see my note below. [Consult Closing:] : Thank you very much for allowing me to participate in the care of this patient.  If you have any questions, please do not hesitate to contact me. [Sincerely,] : Sincerely, [FreeTextEntry3] : Suzan Troncoso MD\par Breast Surgeon\par Division of Surgical Oncology\par Department of Surgery\par [address] [place] [address]\[place] [address] \par Tel: [phone]\par Fax: [phone]\par Email: [email or web address]

## 2023-02-28 NOTE — HISTORY OF PRESENT ILLNESS
[FreeTextEntry1] : 47 yo P3 here for F/u after MRI.\par Pt initially interested in maintaining the option for future fertility. She mentioned that she has three girls, and it would be nice to have a boy. Also mentioned that her sister has been having some issues TTC.

## 2023-02-28 NOTE — CONSULT LETTER
[Dear  ___] : Dear  [unfilled], [Courtesy Letter:] : I had the pleasure of seeing your patient, [unfilled], in my office today. [Please see my note below.] : Please see my note below. [Consult Closing:] : Thank you very much for allowing me to participate in the care of this patient.  If you have any questions, please do not hesitate to contact me. [Sincerely,] : Sincerely, [FreeTextEntry3] : Suzan Troncoso MD\par Breast Surgeon\par Division of Surgical Oncology\par Department of Surgery\par [address] [place] [address]\[place] [address] \par Tel: [phone]\par Fax: [phone]\par Email: [email or web address]

## 2023-02-28 NOTE — ASSESSMENT
[FreeTextEntry1] : The patient is a 48 year F referred by Dr. Sherie Mcgregor for consultation regarding L IDC w/ tubular features %, [place] 90% strong, HER2 Equivocal, CISH neg\par \par 11/10/2022 L lumpectomy and L SLNB\par - Tubular carcinoma, [place] grade 1, 1.8 cm. DCIS, nuclear grade 2, cribriform, flat and micropapillary types with calcifications. The invasive tumor is admixed with the invasive component and constitutes less than 5% of the total tumor volume. No LVI\par - Margins negative\par - 8 SLN negative (0/8)\par -  %, [place] 90% strong, HER2 2+ Equivocal, CISH neg\par - [id number], AJCC stage IA\par - [id number]\par   \par 11/22/2023 Exam shows a well-healing incision, no evidence of infection, hematoma or seroma.\par \par Exam today shows post RT hyperpigmentation, expected skin changes.\par \par Plan:\par - MRI 4/2023 to follow-up R breast mass (pt had opted not to do bx at the time)\par - B/L SM/US 10/2023 \par - F/u med onc and rad onc\par - RTO 6-9 months\par \par \par

## 2023-02-28 NOTE — HISTORY OF PRESENT ILLNESS
[FreeTextEntry1] : The patient is a 48 year F referred by Dr. Sherie Mcgregor for consultation regarding L IDC w/ tubular features s/p L lumpectomy and L SLNB on 11/10/2022\par \par \par Patient previously had B/L USG-CNB in 5/2021 revealing R 9:00 PASH and L 10:00 fibroadenomatoid and PASH, concordant. Radiology recommended f/u imaging in 6 months. She denies any breast symptoms prior to her imaging this year.\par \par Most recent imaging:\par 9/27/2022 B/L DM (NW) TC 12.% revealed heterogeneously dense breasts \par - L UOQ architectural distortion -> Stereo\par - B/L previously bx masses are not significantly changed and contain bx markers\par - Nodular parenchymal pattern\par \par 9/27/2022 B/L US\par - R 9:00 N14 2.1 x 1.5 x 3 cm heterogenous mass, stable from 5/2021 (previously bx PASH). W/in this mass there is a 0.9 x 0.7 x 1 cm cyst.\par - R UOQ multiple cysts and cyst clusters ranging in size measuring up to 3 mm\par - L 10:00 N8 1.8 x 1.2 x 2.3 cm hypoechoic mass (previously bx yielding fibroadenomatoid nodule and PASH) unchanged from 5/2021.\par - BR 4B\par \par 10/3/2022 L Stereo \par - L UOQ (tophat): Tubular carcinoma, G1, 0.3 cm. Calcs and necrosis absent. DCIS, cribriform, low grade atypia present w/ microcalcs. Smooth muscle myosin heavy chain and P63 show absence of myoepithelial cells in the infiltrative glands, support histologic dx of tubular carcinoma. IDP w/ microcalcs.  \par - %, [place] 90% strong, HER2 2+ Equivocal, CISH neg\par *Corrected: IDC w/ tubular features, G1, 0.3 cm. ADH w/ calcs, cannot exclude DCIS; FEA; IDP. \par \par She reports anemia from fibroids. She is scheduled for a myomectomy in Dec 2022.\par Patient's past surgical history include D&C, hysteroscopy w/ poly removal, B/L breast biopsies (5/2021).\par She takes no medications.\par Patient has no family history of breast cancer, or other cancers.\par \par 10/19/2022 [facility] GT (9 gene panel): negative\par \par 10/24/2022 MRI (NW)\par - R LOQ bx marker w/in circumscribed enhancing mass (previously bx PASH)\par - R upper central 1.7 cm in AP dimension circumscribed T2 hyperintense enhancing mass demonstrating progressive kinetics -> MR BX if it alters treatment plan\par - L 1.8 x 2 x 2.6 cm clumped NME-> wide excision \par - L UIQ 2.4 cm circumscribed mass containing bx marker (yielding PASH)\par - BR4A\par \par 11/10/2022 L lumpectomy and L SLNB\par - Tubular carcinoma, [place] grade 1, 1.8 cm. DCIS, nuclear grade 2, cribriform, flat and micropapillary types with calcifications. The invasive tumor is admixed with the invasive component and constitutes less than 5% of the total tumor volume. No LVI\par - Margins negative\par - 8 SLN negative (0/8)\par -  %, [place] 90% strong, HER2 2+ Equivocal, CISH neg\par - [id number], AJCC stage IA\par \par 11/22/2022 (postop): \par The patient reports that she is feeling well. Notes some numbness and feeling "funny" on the back of her left arm. Denies any arm swelling. Denies any breast pain. \par \par Interval History: Patient completed radiation therapy on 2/17/2023. Has noticed burning pain in the breast associated with skin darkening, and complaint of "pus drainage." Applying creams. No fevers/chills.\par \par Planned for hysterectomy 3/20/23. Will be seeing Dr Jewell on 3/9 for followup for endocrine therapy.

## 2023-02-28 NOTE — ASSESSMENT
[FreeTextEntry1] : The patient is a 48 year F referred by Dr. Sherie Mcgregor for consultation regarding L IDC w/ tubular features %, [place] 90% strong, HER2 Equivocal, CISH neg\par \par 11/10/2022 L lumpectomy and L SLNB\par - Tubular carcinoma, [place] grade 1, 1.8 cm. DCIS, nuclear grade 2, cribriform, flat and micropapillary types with calcifications. The invasive tumor is admixed with the invasive component and constitutes less than 5% of the total tumor volume. No LVI\par - Margins negative\par - 8 SLN negative (0/8)\par -  %, [place] 90% strong, HER2 2+ Equivocal, CISH neg\par - [id number], AJCC stage IA\par - [id number]\par   \par 11/22/2023 Exam shows a well-healing incision, no evidence of infection, hematoma or seroma.\par \par Exam today shows post RT hyperpigmentation, expected skin changes.\par \par Plan:\par - MRI 4/2023 to follow-up R breast mass (pt had opted not to do bx at the time)\par - B/L SM/US 10/2023 \par - F/u med onc and rad onc\par - RTO 6-9 months\par \par \par

## 2023-02-28 NOTE — PHYSICAL EXAM
[Normocephalic] : normocephalic [Atraumatic] : atraumatic [EOMI] : extra ocular movement intact [PERRL] : pupils equal, round and reactive to light [Sclera nonicteric] : sclera nonicteric [Supple] : supple [No Supraclavicular Adenopathy] : no supraclavicular adenopathy [No Cervical Adenopathy] : no cervical adenopathy [Examined in the supine and seated position] : examined in the supine and seated position [Symmetrical] : symmetrical [Bra Size: ___] : Bra Size: [unfilled] [Grade 2] : Ptosis Grade 2 [Breast Mass Right Breast ___cm] : no masses [Breast Mass Left Breast ___cm] : no masses [Breast Nipple Inversion] : nipples not inverted [Breast Nipple Retraction] : nipples not retracted [Breast Nipple Flattening] : nipples not flattened [No Edema] : no edema [No Rashes] : no rashes [No Ulceration] : no ulceration [de-identified] : non-labored respirations  [de-identified] : post RT hyperpigmentation, no erythema [de-identified] : Left nipple with cleft, longstanding per pt [de-identified] : low axillary transverse incision well-healed, no erythema, no fluctuance

## 2023-02-28 NOTE — HISTORY OF PRESENT ILLNESS
[FreeTextEntry1] : The patient is a 48 year F referred by Dr. Sherie Mcgregor for consultation regarding L IDC w/ tubular features s/p L lumpectomy and L SLNB on 11/10/2022\par \par \par Patient previously had B/L USG-CNB in 5/2021 revealing R 9:00 PASH and L 10:00 fibroadenomatoid and PASH, concordant. Radiology recommended f/u imaging in 6 months. She denies any breast symptoms prior to her imaging this year.\par \par Most recent imaging:\par 9/27/2022 B/L DM (NW) TC 12.% revealed heterogeneously dense breasts \par - L UOQ architectural distortion -> Stereo\par - B/L previously bx masses are not significantly changed and contain bx markers\par - Nodular parenchymal pattern\par \par 9/27/2022 B/L US\par - R 9:00 N14 2.1 x 1.5 x 3 cm heterogenous mass, stable from 5/2021 (previously bx PASH). W/in this mass there is a 0.9 x 0.7 x 1 cm cyst.\par - R UOQ multiple cysts and cyst clusters ranging in size measuring up to 3 mm\par - L 10:00 N8 1.8 x 1.2 x 2.3 cm hypoechoic mass (previously bx yielding fibroadenomatoid nodule and PASH) unchanged from 5/2021.\par - BR 4B\par \par 10/3/2022 L Stereo \par - L UOQ (tophat): Tubular carcinoma, G1, 0.3 cm. Calcs and necrosis absent. DCIS, cribriform, low grade atypia present w/ microcalcs. Smooth muscle myosin heavy chain and P63 show absence of myoepithelial cells in the infiltrative glands, support histologic dx of tubular carcinoma. IDP w/ microcalcs.  \par - %, ID 90% strong, HER2 2+ Equivocal, CISH neg\par *Corrected: IDC w/ tubular features, G1, 0.3 cm. ADH w/ calcs, cannot exclude DCIS; FEA; IDP. \par \par She reports anemia from fibroids. She is scheduled for a myomectomy in Dec 2022.\par Patient's past surgical history include D&C, hysteroscopy w/ poly removal, B/L breast biopsies (5/2021).\par She takes no medications.\par Patient has no family history of breast cancer, or other cancers.\par \par 10/19/2022 [facility] GT (9 gene panel): negative\par \par 10/24/2022 MRI (NW)\par - R LOQ bx marker w/in circumscribed enhancing mass (previously bx PASH)\par - R upper central 1.7 cm in AP dimension circumscribed T2 hyperintense enhancing mass demonstrating progressive kinetics -> MR BX if it alters treatment plan\par - L 1.8 x 2 x 2.6 cm clumped NME-> wide excision \par - L UIQ 2.4 cm circumscribed mass containing bx marker (yielding PASH)\par - BR4A\par \par 11/10/2022 L lumpectomy and L SLNB\par - Tubular carcinoma, [place] grade 1, 1.8 cm. DCIS, nuclear grade 2, cribriform, flat and micropapillary types with calcifications. The invasive tumor is admixed with the invasive component and constitutes less than 5% of the total tumor volume. No LVI\par - Margins negative\par - 8 SLN negative (0/8)\par -  %, ID 90% strong, HER2 2+ Equivocal, CISH neg\par - [id number], AJCC stage IA\par \par 11/22/2022 (postop): \par The patient reports that she is feeling well. Notes some numbness and feeling "funny" on the back of her left arm. Denies any arm swelling. Denies any breast pain. \par \par Interval History: Patient completed radiation therapy on 2/17/2023. Has noticed burning pain in the breast associated with skin darkening, and complaint of "pus drainage." Applying creams. No fevers/chills.\par \par Planned for hysterectomy 3/20/23. Will be seeing Dr Jewell on 3/9 for followup for endocrine therapy.

## 2023-02-28 NOTE — DISCUSSION/SUMMARY
[FreeTextEntry1] : 49 yo P3 w/symptomatic ut myomas.\par MRI images reviewed w/pt. We discussed her numerous myomas as well as the focus of adenomyosis. We spoke about options for management including abd myomectomy w/ concern for need for future surgery, questionable results for adeno, and questionable ability to conceive even after myomectomy.  Pt expressed concern about possibility that her fibroids and polyps could progress to cancer. I explained that this would be a rare occurrence. \par \par After further discussion, pt interested in TLH w/ovarian preservation.\par She would like to keep her appointment for surgery on March 20.

## 2023-02-28 NOTE — PHYSICAL EXAM
[Normocephalic] : normocephalic [Atraumatic] : atraumatic [EOMI] : extra ocular movement intact [PERRL] : pupils equal, round and reactive to light [Sclera nonicteric] : sclera nonicteric [Supple] : supple [No Supraclavicular Adenopathy] : no supraclavicular adenopathy [No Cervical Adenopathy] : no cervical adenopathy [Examined in the supine and seated position] : examined in the supine and seated position [Symmetrical] : symmetrical [Bra Size: ___] : Bra Size: [unfilled] [Grade 2] : Ptosis Grade 2 [Breast Mass Right Breast ___cm] : no masses [Breast Mass Left Breast ___cm] : no masses [Breast Nipple Inversion] : nipples not inverted [Breast Nipple Retraction] : nipples not retracted [Breast Nipple Flattening] : nipples not flattened [No Edema] : no edema [No Rashes] : no rashes [No Ulceration] : no ulceration [de-identified] : non-labored respirations  [de-identified] : post RT hyperpigmentation, no erythema [de-identified] : Left nipple with cleft, longstanding per pt [de-identified] : low axillary transverse incision well-healed, no erythema, no fluctuance

## 2023-02-28 NOTE — REVIEW OF SYSTEMS
[As Noted in HPI] : as noted in HPI [Skin Lesions] : skin lesion [Breast Pain] : breast pain [Negative] : Heme/Lymph

## 2023-03-01 ENCOUNTER — NON-APPOINTMENT (OUTPATIENT)
Age: 48
End: 2023-03-01

## 2023-03-03 ENCOUNTER — OUTPATIENT (OUTPATIENT)
Dept: OUTPATIENT SERVICES | Facility: HOSPITAL | Age: 48
LOS: 1 days | Discharge: HOME | End: 2023-03-03

## 2023-03-03 DIAGNOSIS — Z90.49 ACQUIRED ABSENCE OF OTHER SPECIFIED PARTS OF DIGESTIVE TRACT: Chronic | ICD-10-CM

## 2023-03-03 DIAGNOSIS — Z98.890 OTHER SPECIFIED POSTPROCEDURAL STATES: Chronic | ICD-10-CM

## 2023-03-03 DIAGNOSIS — O03.9 COMPLETE OR UNSPECIFIED SPONTANEOUS ABORTION WITHOUT COMPLICATION: Chronic | ICD-10-CM

## 2023-03-03 DIAGNOSIS — R92.8 OTHER ABNORMAL AND INCONCLUSIVE FINDINGS ON DIAGNOSTIC IMAGING OF BREAST: ICD-10-CM

## 2023-03-08 ENCOUNTER — APPOINTMENT (OUTPATIENT)
Dept: OBGYN | Facility: CLINIC | Age: 48
End: 2023-03-08

## 2023-03-09 ENCOUNTER — APPOINTMENT (OUTPATIENT)
Dept: HEMATOLOGY ONCOLOGY | Facility: CLINIC | Age: 48
End: 2023-03-09
Payer: COMMERCIAL

## 2023-03-09 VITALS
HEART RATE: 85 BPM | RESPIRATION RATE: 18 BRPM | OXYGEN SATURATION: 99 % | DIASTOLIC BLOOD PRESSURE: 88 MMHG | TEMPERATURE: 97.9 F | WEIGHT: 200 LBS | BODY MASS INDEX: 36.34 KG/M2 | SYSTOLIC BLOOD PRESSURE: 132 MMHG

## 2023-03-09 DIAGNOSIS — L58.9 RADIODERMATITIS, UNSPECIFIED: ICD-10-CM

## 2023-03-09 PROCEDURE — 99214 OFFICE O/P EST MOD 30 MIN: CPT

## 2023-03-09 NOTE — ASSESSMENT
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; also with history of PASH of the right breast; s/p lumpectomy in 11/2022; Oncotype which was low risk with recurrence score of 13; adivsed patient that there is no role for adjuvant chemotherapy in her case given low risk disease; completed adjuvant RT 2/17/2023;  advised patient that after completing RT, would recommend initiation of adjuvant endocrine therapy with either tamoxifen 20mg daily for 7-10 years or ovarian suppression and use of aromatase inhibitor for 7-10 years; \par \par - patient is scheduled for a hysterectomy with ovary preservation on 4/7/2023 - we discussed the potential therapeutic benefit with oopherectomy given her ER+ breast cancer.  We discussed that she would then be a candidate for both Tamoxifen and aromatase inhibitors without the addition of Lupron. We reviewed the potential effects of premature menopause including hot flashes, weight gain, joint pain, vaginal dryness, risk of osteoporosis and heart disease.  Patient states she has been getting night sweats and wonders if she is starting to go through menopause.  Her last estradiol/LH/FSH were still in premenopausal range. \par - patient would be interested in oopherectomy w/ hysterectomy - will discuss with Dr. Morley and if in agreement can reach out to her gynecologist to discuss further. \par - risk/benefits/side effects of tamoxifen including but not limited to increased risk of endometrial cancer, increased risk of thrombosis and cataracts discussed with patient\par - risk/benefits/side effects of AIs including but not limited to arthralgia, myalagia, osteoporosis and hyperlipidemia discussed with patient\par - she is apprehensive about making decision about endocrine therapy at this time; advised okay to wait as she will \par - also discussed with patient that her anemia, while most likely related to heavy menstrual bleeding, may need additional evaluation by GI as she is of screening age for colonoscopy at 47 years old\par - Patient had the opportunity to have all their questions answered to their satisfaction \par - Will hold off on starting endocrine therapy until after surgery given the potential thrombotic effect of Tamoxifen; f/up via phone one week post op for endocrine RX; f/u in office in about 3 months with Dr. morley\par

## 2023-03-09 NOTE — PHYSICAL EXAM
[Normal] : affect appropriate [de-identified] : left breast with inverted nipple which has been chronic since pregnancy; left lateral incision well healed with minimal scar tissue;left breast with significant hyperpigmentation due to RT and new skin without evidence of ulceration; no palpable lesions in either breast [de-identified] : no cervical, supraclav or axillary LAD

## 2023-03-09 NOTE — CONSULT LETTER
[Consult Letter:] : I had the pleasure of evaluating your patient, [unfilled]. [Please see my note below.] : Please see my note below. [Consult Closing:] : Thank you very much for allowing me to participate in the care of this patient.  If you have any questions, please do not hesitate to contact me. [Sincerely,] : Sincerely, [FreeTextEntry3] : Musa Lauren MD\par \par Division of Hematology/Oncology\par [facility]\par [address]\[place] [address] \par Tel: [phone]\par Fax: [phone]\par Email: [email or web address]

## 2023-03-09 NOTE — REVIEW OF SYSTEMS
patient [Fatigue] : fatigue [Dysmenorrhea/Abn Vaginal Bleeding] : dysmenorrhea/abnormal vaginal bleeding [Negative] : Allergic/Immunologic

## 2023-03-09 NOTE — END OF VISIT
[FreeTextEntry3] : Patient being seen as per physician's primary plan of care.\par Dr. Lauren was present for this visit and advised on primary plan of care for this patient.\par

## 2023-03-09 NOTE — HISTORY OF PRESENT ILLNESS
[Disease: _____________________] : Disease: [unfilled] [T: ___] : T[unfilled] [N: ___] : N[unfilled] [M: ___] : M[unfilled] [AJCC Stage: ____] : AJCC Stage: [unfilled] [de-identified] : Patient who in 2021 was noted onto have Right breast PASH at 9:00 and Left Breast fibroadenomatoid and PASH at 10:00, concordant, undergoing f/u imaging in 6 months. She denies any breast symptoms prior to her imaging this year.\par \par Most recent imaging BIRAD-4B:\par 2022 B/L Diagnostic mammo (NW) TC 12.% revealed heterogeneously dense breasts \par - L UOQ architectural distortion -> Stereotactic biopsy recommended\par - B/L previously bx masses are not significantly changed and contain bx markers\par - Nodular parenchymal pattern\par \par 2022 B/L US\par - R 9:00 N14 2.1 x 1.5 x 3 cm heterogenous mass, stable from 2021 (previously bx PASH). 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 Calcs and necrosis absent. DCIS, cribriform, low grade atypia present w/ microcalcs. Smooth muscle myosin heavy chain and P63 show absence of myoepithelial cells in the infiltrative glands, support histologic dx of tubular carcinoma. IDP w/ microcalcs. \par - %, MS 90% strong, HER2 2+ Equivocal, CISH negative\par \par Preoperative MRI on 10/24/22 showed biopsy proven malignancy in Upper outer quadrant of the left breast (2.6cm); recommended wide excsision. T2 hyperintese cicumscribed mass in upper central right breast with similar appearance to other biopsy proven PASH was also noted.\par \par Genetic Testing with [facility] - negative\par \par On 11/10/22 she underwent Left breast lumpectomy and SLNB; Final Path Tubular carcinoma, Allison grade 4/9, 1.8cm in largest dimension; additionally DCIS with flat and micropappillary features was noted; all margins negative, 2 SLN removed and negative; Oncotype recurrence score 13\par \par \par Patient has also been found to have iron deficiency anemia as per her primary care physican and has started on IV iron infusions as of last week. Is suspected to have iron deficiency due to heavy menstrual periods as result of fibroids; has not had GI evaluation\par \par She was seen by Radiation Oncology earlier this morning for evaluation and is planned for adjuvant RT.\par Presents for Medical Oncology evaluation\par \par \par GYN history Menarche at 13, having heavy menstrual periods with LMP 12/10/22, , 1 miscarriage; 1st full term pregnancy at age 24; did not breastfeed  [de-identified] : %, PR90%, [id number] equivocal by IHC, CISH negative [de-identified] : 3/9/2023\par Patient returns today post radiation which was completed on 2/17/2023 to discuss adjuvant endocrine therapy.  Patient had a very difficult time with RT due to significant skin toxicities - she states that she is starting to feel better now. \par \par Also of note is since her last visit, patient was given IV iron x 5 treatments from hematologist in [place] - she had significant anemia due to menorrhagia related to uterine fibroids.  She also spoke with her gynecologist and they are planning on a hysterectomy on 4/7/2023 with ovarian preservation.  She notes that she did not share with her gynecologist her recent diagnosis of Breast Cancer.

## 2023-03-13 ENCOUNTER — NON-APPOINTMENT (OUTPATIENT)
Age: 48
End: 2023-03-13

## 2023-03-20 ENCOUNTER — APPOINTMENT (OUTPATIENT)
Dept: OBGYN | Facility: HOSPITAL | Age: 48
End: 2023-03-20

## 2023-03-23 ENCOUNTER — ASOB RESULT (OUTPATIENT)
Age: 48
End: 2023-03-23

## 2023-03-23 ENCOUNTER — APPOINTMENT (OUTPATIENT)
Dept: OBGYN | Facility: CLINIC | Age: 48
End: 2023-03-23
Payer: COMMERCIAL

## 2023-03-23 PROCEDURE — 76830 TRANSVAGINAL US NON-OB: CPT

## 2023-03-23 PROCEDURE — 76857 US EXAM PELVIC LIMITED: CPT

## 2023-03-27 ENCOUNTER — APPOINTMENT (OUTPATIENT)
Dept: RADIATION ONCOLOGY | Facility: CLINIC | Age: 48
End: 2023-03-27

## 2023-03-28 ENCOUNTER — OUTPATIENT (OUTPATIENT)
Dept: OUTPATIENT SERVICES | Facility: HOSPITAL | Age: 48
LOS: 1 days | End: 2023-03-28
Payer: COMMERCIAL

## 2023-03-28 VITALS
OXYGEN SATURATION: 97 % | RESPIRATION RATE: 16 BRPM | DIASTOLIC BLOOD PRESSURE: 91 MMHG | HEIGHT: 62 IN | TEMPERATURE: 101 F | WEIGHT: 203.05 LBS | SYSTOLIC BLOOD PRESSURE: 142 MMHG | HEART RATE: 90 BPM

## 2023-03-28 DIAGNOSIS — D25.9 LEIOMYOMA OF UTERUS, UNSPECIFIED: ICD-10-CM

## 2023-03-28 DIAGNOSIS — Z98.890 OTHER SPECIFIED POSTPROCEDURAL STATES: Chronic | ICD-10-CM

## 2023-03-28 DIAGNOSIS — R50.9 FEVER, UNSPECIFIED: ICD-10-CM

## 2023-03-28 DIAGNOSIS — O03.9 COMPLETE OR UNSPECIFIED SPONTANEOUS ABORTION WITHOUT COMPLICATION: Chronic | ICD-10-CM

## 2023-03-28 DIAGNOSIS — R09.89 OTHER SPECIFIED SYMPTOMS AND SIGNS INVOLVING THE CIRCULATORY AND RESPIRATORY SYSTEMS: ICD-10-CM

## 2023-03-28 DIAGNOSIS — Z90.49 ACQUIRED ABSENCE OF OTHER SPECIFIED PARTS OF DIGESTIVE TRACT: Chronic | ICD-10-CM

## 2023-03-28 LAB
A1C WITH ESTIMATED AVERAGE GLUCOSE RESULT: 5.2 % — SIGNIFICANT CHANGE UP (ref 4–5.6)
ALBUMIN SERPL ELPH-MCNC: 4.1 G/DL — SIGNIFICANT CHANGE UP (ref 3.3–5)
ALP SERPL-CCNC: 53 U/L — SIGNIFICANT CHANGE UP (ref 40–120)
ALT FLD-CCNC: 11 U/L — SIGNIFICANT CHANGE UP (ref 4–33)
ANION GAP SERPL CALC-SCNC: 11 MMOL/L — SIGNIFICANT CHANGE UP (ref 7–14)
ANISOCYTOSIS BLD QL: SLIGHT — SIGNIFICANT CHANGE UP
AST SERPL-CCNC: 16 U/L — SIGNIFICANT CHANGE UP (ref 4–32)
BASOPHILS # BLD AUTO: 0.1 K/UL — SIGNIFICANT CHANGE UP (ref 0–0.2)
BASOPHILS NFR BLD AUTO: 3.5 % — HIGH (ref 0–2)
BILIRUB SERPL-MCNC: 0.3 MG/DL — SIGNIFICANT CHANGE UP (ref 0.2–1.2)
BLD GP AB SCN SERPL QL: NEGATIVE — SIGNIFICANT CHANGE UP
BUN SERPL-MCNC: 11 MG/DL — SIGNIFICANT CHANGE UP (ref 7–23)
CALCIUM SERPL-MCNC: 8.7 MG/DL — SIGNIFICANT CHANGE UP (ref 8.4–10.5)
CHLORIDE SERPL-SCNC: 101 MMOL/L — SIGNIFICANT CHANGE UP (ref 98–107)
CO2 SERPL-SCNC: 25 MMOL/L — SIGNIFICANT CHANGE UP (ref 22–31)
CREAT SERPL-MCNC: 0.81 MG/DL — SIGNIFICANT CHANGE UP (ref 0.5–1.3)
EGFR: 91 ML/MIN/1.73M2 — SIGNIFICANT CHANGE UP
EOSINOPHIL # BLD AUTO: 0.05 K/UL — SIGNIFICANT CHANGE UP (ref 0–0.5)
EOSINOPHIL NFR BLD AUTO: 1.7 % — SIGNIFICANT CHANGE UP (ref 0–6)
ESTIMATED AVERAGE GLUCOSE: 103 — SIGNIFICANT CHANGE UP
GIANT PLATELETS BLD QL SMEAR: PRESENT — SIGNIFICANT CHANGE UP
GLUCOSE SERPL-MCNC: 89 MG/DL — SIGNIFICANT CHANGE UP (ref 70–99)
HCG SERPL-ACNC: <5 MIU/ML — SIGNIFICANT CHANGE UP
HCT VFR BLD CALC: 37.1 % — SIGNIFICANT CHANGE UP (ref 34.5–45)
HGB BLD-MCNC: 11.4 G/DL — LOW (ref 11.5–15.5)
IANC: 1.52 K/UL — LOW (ref 1.8–7.4)
LYMPHOCYTES # BLD AUTO: 0.56 K/UL — LOW (ref 1–3.3)
LYMPHOCYTES # BLD AUTO: 19.1 % — SIGNIFICANT CHANGE UP (ref 13–44)
MACROCYTES BLD QL: SLIGHT — SIGNIFICANT CHANGE UP
MCHC RBC-ENTMCNC: 25.1 PG — LOW (ref 27–34)
MCHC RBC-ENTMCNC: 30.7 GM/DL — LOW (ref 32–36)
MCV RBC AUTO: 81.5 FL — SIGNIFICANT CHANGE UP (ref 80–100)
MONOCYTES # BLD AUTO: 0.21 K/UL — SIGNIFICANT CHANGE UP (ref 0–0.9)
MONOCYTES NFR BLD AUTO: 7 % — SIGNIFICANT CHANGE UP (ref 2–14)
NEUTROPHILS # BLD AUTO: 1.82 K/UL — SIGNIFICANT CHANGE UP (ref 1.8–7.4)
NEUTROPHILS NFR BLD AUTO: 61.7 % — SIGNIFICANT CHANGE UP (ref 43–77)
PLAT MORPH BLD: NORMAL — SIGNIFICANT CHANGE UP
PLATELET # BLD AUTO: 287 K/UL — SIGNIFICANT CHANGE UP (ref 150–400)
PLATELET COUNT - ESTIMATE: NORMAL — SIGNIFICANT CHANGE UP
POTASSIUM SERPL-MCNC: 3.6 MMOL/L — SIGNIFICANT CHANGE UP (ref 3.5–5.3)
POTASSIUM SERPL-SCNC: 3.6 MMOL/L — SIGNIFICANT CHANGE UP (ref 3.5–5.3)
PROT SERPL-MCNC: 7.3 G/DL — SIGNIFICANT CHANGE UP (ref 6–8.3)
RBC # BLD: 4.55 M/UL — SIGNIFICANT CHANGE UP (ref 3.8–5.2)
RBC # FLD: 17.4 % — HIGH (ref 10.3–14.5)
RBC BLD AUTO: ABNORMAL
RH IG SCN BLD-IMP: POSITIVE — SIGNIFICANT CHANGE UP
SMUDGE CELLS # BLD: PRESENT — SIGNIFICANT CHANGE UP
SODIUM SERPL-SCNC: 137 MMOL/L — SIGNIFICANT CHANGE UP (ref 135–145)
VARIANT LYMPHS # BLD: 7 % — HIGH (ref 0–6)
WBC # BLD: 2.95 K/UL — LOW (ref 3.8–10.5)
WBC # FLD AUTO: 2.95 K/UL — LOW (ref 3.8–10.5)

## 2023-03-28 PROCEDURE — 93010 ELECTROCARDIOGRAM REPORT: CPT

## 2023-03-28 RX ORDER — SODIUM CHLORIDE 9 MG/ML
1000 INJECTION, SOLUTION INTRAVENOUS
Refills: 0 | Status: DISCONTINUED | OUTPATIENT
Start: 2023-04-07 | End: 2023-04-21

## 2023-03-28 RX ORDER — CHLORHEXIDINE GLUCONATE 213 G/1000ML
1 SOLUTION TOPICAL ONCE
Refills: 0 | Status: DISCONTINUED | OUTPATIENT
Start: 2023-04-07 | End: 2023-04-21

## 2023-03-28 NOTE — H&P PST ADULT - NSICDXPASTSURGICALHX_GEN_ALL_CORE_FT
PAST SURGICAL HISTORY:       H/O breast biopsy bilateral 21 radiology    H/O umbilical hernia repair     History of D&C     S/P breast lumpectomy     S/P cholecystectomy with hernia repair 21 - [facility]

## 2023-03-28 NOTE — H&P PST ADULT - PROBLEM SELECTOR PLAN 1
Patient is tentatively scheduled for surgery- total laparoscopic hysterectomy with Dr Nguyễn on 04/07/23.    Pre-op instructions provided. Pt given verbal and written instructions with teach back on chlorhexidine wash and pepcid. Pt verbalized understanding with return demonstration.    CBC, A1C, CMP, T&S, HCG  sent    Urine cup provided for day of procedure pregnancy test.

## 2023-03-28 NOTE — H&P PST ADULT - NSICDXPASTMEDICALHX_GEN_ALL_CORE_FT
PAST MEDICAL HISTORY:  Breast cancer     H/O vertigo     Need for dental care 5/28/21 , had dental work today placed on amoxicillin for 5 days    S/P radiation therapy     Uterine fibroid     Uterine polyp

## 2023-03-28 NOTE — H&P PST ADULT - POSTERIOR CERVICAL R
Stopped Today: prednisolone acetate (prednisolone acetate): drops,suspension: 1% 1 drop as directed into both eyes 04- normal

## 2023-03-28 NOTE — H&P PST ADULT - GENITOURINARY COMMENTS
patient reports that she had multiple uterine fibroids and needed hysterectomy not examined/ pre op diagnosis-  leiomyoma of uterus unspecified

## 2023-03-28 NOTE — H&P PST ADULT - PROBLEM SELECTOR PLAN 2
Patient with PMH of Breast cancer - s/p lumpectomy and radiation therapy, Pt had elevated blood pressure and high fever- 101.5 F today at PST, denies any cough, sore throat, headache, dysuria.- requesting medical evaluation prior to surgery.      Emailed surgeon and copy in chart  EKG at PST in chart

## 2023-03-28 NOTE — H&P PST ADULT - HISTORY OF PRESENT ILLNESS
48 year old female with PMH of Left breast cancer- tubular carcinoma- s/p lumpectomy 11/2022 and radiation, with pre op diagnosis of uterine leiomyoma, for pre op evaluation prior to scheduled surgery- total laparoscopic hysterectomy with Dr Nguyễn.

## 2023-04-05 ENCOUNTER — APPOINTMENT (OUTPATIENT)
Dept: OBGYN | Facility: CLINIC | Age: 48
End: 2023-04-05
Payer: COMMERCIAL

## 2023-04-05 ENCOUNTER — APPOINTMENT (OUTPATIENT)
Dept: OBGYN | Facility: CLINIC | Age: 48
End: 2023-04-05

## 2023-04-05 PROCEDURE — 99212 OFFICE O/P EST SF 10 MIN: CPT | Mod: 95

## 2023-04-06 ENCOUNTER — TRANSCRIPTION ENCOUNTER (OUTPATIENT)
Age: 48
End: 2023-04-06

## 2023-04-06 NOTE — ASU PATIENT PROFILE, ADULT - NSICDXPASTSURGICALHX_GEN_ALL_CORE_FT
PAST SURGICAL HISTORY:       H/O breast biopsy bilateral 21 radiology    H/O umbilical hernia repair     History of D&C     S/P breast lumpectomy     S/P cholecystectomy with hernia repair 21 - [facility]

## 2023-04-07 ENCOUNTER — TRANSCRIPTION ENCOUNTER (OUTPATIENT)
Age: 48
End: 2023-04-07

## 2023-04-07 ENCOUNTER — APPOINTMENT (OUTPATIENT)
Dept: OBGYN | Facility: HOSPITAL | Age: 48
End: 2023-04-07

## 2023-04-07 ENCOUNTER — RESULT REVIEW (OUTPATIENT)
Age: 48
End: 2023-04-07

## 2023-04-07 ENCOUNTER — OUTPATIENT (OUTPATIENT)
Dept: OUTPATIENT SERVICES | Facility: HOSPITAL | Age: 48
LOS: 1 days | Discharge: HOME | End: 2023-04-07
Payer: COMMERCIAL

## 2023-04-07 VITALS
SYSTOLIC BLOOD PRESSURE: 135 MMHG | OXYGEN SATURATION: 100 % | HEART RATE: 99 BPM | RESPIRATION RATE: 17 BRPM | TEMPERATURE: 97 F | DIASTOLIC BLOOD PRESSURE: 78 MMHG

## 2023-04-07 VITALS
OXYGEN SATURATION: 99 % | WEIGHT: 203.05 LBS | DIASTOLIC BLOOD PRESSURE: 75 MMHG | HEART RATE: 75 BPM | TEMPERATURE: 98 F | SYSTOLIC BLOOD PRESSURE: 131 MMHG | RESPIRATION RATE: 18 BRPM | HEIGHT: 62 IN

## 2023-04-07 DIAGNOSIS — Z90.49 ACQUIRED ABSENCE OF OTHER SPECIFIED PARTS OF DIGESTIVE TRACT: Chronic | ICD-10-CM

## 2023-04-07 DIAGNOSIS — Z98.890 OTHER SPECIFIED POSTPROCEDURAL STATES: Chronic | ICD-10-CM

## 2023-04-07 DIAGNOSIS — D25.9 LEIOMYOMA OF UTERUS, UNSPECIFIED: ICD-10-CM

## 2023-04-07 DIAGNOSIS — O03.9 COMPLETE OR UNSPECIFIED SPONTANEOUS ABORTION WITHOUT COMPLICATION: Chronic | ICD-10-CM

## 2023-04-07 LAB
HCG UR QL: NEGATIVE — SIGNIFICANT CHANGE UP
RH IG SCN BLD-IMP: POSITIVE — SIGNIFICANT CHANGE UP

## 2023-04-07 PROCEDURE — 58573 TLH W/T/O UTERUS OVER 250 G: CPT

## 2023-04-07 PROCEDURE — 88307 TISSUE EXAM BY PATHOLOGIST: CPT | Mod: 26

## 2023-04-07 RX ORDER — ACETAMINOPHEN 500 MG
2 TABLET ORAL
Qty: 0 | Refills: 0 | DISCHARGE

## 2023-04-07 RX ORDER — MULTIVIT-MIN/FERROUS GLUCONATE 9 MG/15 ML
1 LIQUID (ML) ORAL
Qty: 0 | Refills: 0 | DISCHARGE

## 2023-04-07 RX ORDER — ASCORBIC ACID 60 MG
0 TABLET,CHEWABLE ORAL
Qty: 0 | Refills: 0 | DISCHARGE

## 2023-04-07 RX ORDER — OXYCODONE HYDROCHLORIDE 5 MG/1
1 TABLET ORAL
Qty: 6 | Refills: 0
Start: 2023-04-07 | End: 2023-04-08

## 2023-04-07 RX ORDER — FENTANYL CITRATE 50 UG/ML
25 INJECTION INTRAVENOUS
Refills: 0 | Status: DISCONTINUED | OUTPATIENT
Start: 2023-04-07 | End: 2023-04-07

## 2023-04-07 RX ORDER — MECLIZINE HCL 12.5 MG
0 TABLET ORAL
Qty: 0 | Refills: 0 | DISCHARGE

## 2023-04-07 RX ORDER — IBUPROFEN 200 MG
1 TABLET ORAL
Qty: 0 | Refills: 0 | DISCHARGE

## 2023-04-07 RX ORDER — ONDANSETRON 8 MG/1
4 TABLET, FILM COATED ORAL ONCE
Refills: 0 | Status: DISCONTINUED | OUTPATIENT
Start: 2023-04-07 | End: 2023-04-21

## 2023-04-07 RX ORDER — FENTANYL CITRATE 50 UG/ML
50 INJECTION INTRAVENOUS ONCE
Refills: 0 | Status: DISCONTINUED | OUTPATIENT
Start: 2023-04-07 | End: 2023-04-07

## 2023-04-07 NOTE — BRIEF OPERATIVE NOTE - NSICDXBRIEFPREOP_GEN_ALL_CORE_FT
PRE-OP DIAGNOSIS:  Fibroid uterus 07-Apr-2023 16:41:39  Artemio Hudson  Breast cancer 07-Apr-2023 16:41:47  Artemio Hudson

## 2023-04-07 NOTE — ASU DISCHARGE PLAN (ADULT/PEDIATRIC) - FOLLOW UP APPOINTMENTS
may also call Recovery Room (PACU) 24/7 @ [phone]/[facility]

## 2023-04-07 NOTE — ASU DISCHARGE PLAN (ADULT/PEDIATRIC) - NURSING INSTRUCTIONS
You received IV Tylenol for pain management at _315__. Please DO NOT take any Tylenol (Acetaminophen) containing products, such as Vicodin, Percocet, Excedrin, and cold medications for the next 6 hours (until _1015__ PM). DO NOT TAKE MORE THAN 3000 MG OF TYLENOL in a 24 hour period.

## 2023-04-07 NOTE — BRIEF OPERATIVE NOTE - NSICDXBRIEFPROCEDURE_GEN_ALL_CORE_FT
PROCEDURES:  Laparoscopic total hysterectomy with bilateral salpingo-oophorectomy with cystoscopy 07-Apr-2023 16:41:28  Artemio Hudson

## 2023-04-07 NOTE — ASU DISCHARGE PLAN (ADULT/PEDIATRIC) - NS MD DC FALL RISK RISK
For information on Fall & Injury Prevention, visit: [email or web address] OR  [email or web address] OR  https://www.cdc.gov/steadi/patient.html

## 2023-04-07 NOTE — ASU DISCHARGE PLAN (ADULT/PEDIATRIC) - CARE PROVIDER_API CALL
Mickey Nguyễn; CHAUNCEY)  Obstetrics and Gynecology  [address] [address] [place] [address]  Phone: [phone]  Fax: [phone]  Follow Up Time: 2 weeks

## 2023-04-07 NOTE — CHART NOTE - NSCHARTNOTEFT_GEN_A_CORE
R2 GYN POST-OP CHECK    S: 48y Female s/p [facility] BSO Cystoscopy seen and evaluated at bedside.  Pt awake and alert resting comfortably in bed. Patient reports pain controlled with analgesia. Pt denies N/V, SOB, CP, palpitations, fever/chills. Tolerating clears.  Has been OOB and voided successfully    O:   T(C): 36.3 (04-07-23 @ 18:20), Max: 36.9 (04-07-23 @ 16:10)  HR: 99 (04-07-23 @ 18:20) (80 - 99)  BP: 135/78 (04-07-23 @ 18:20) (129/84 - 142/96)  RR: 17 (04-07-23 @ 18:20) (12 - 19)  SpO2: 100% (04-07-23 @ 18:20) (94% - 100%)  Wt(kg): --  I&O's Summary    07 Apr 2023 07:01  -  07 Apr 2023 18:37  --------------------------------------------------------  IN: 120 mL / OUT: 50 mL / NET: 70 mL        Gen: Resting comftorably in bed, NAD  CV: RRR  Lungs: CTAB  Abd: soft, appropriately tender, non-distended  Inc: LSC incisions c/d/i with steristrips in place   Ext: non-tender b/l, no edema    Pull in Brief Op note.     A/P: 48y Female s/p [facility] BSO Cystoscopy evaluated at bedside for postop check. Patient doing well, meeting all appropriate immediate post-op recovery milestones     1. Neuro: Analgesia PRN. fentaNYL    Injectable 25 MICROGram(s) IV Push every 5 minutes PRN  fentaNYL    Injectable 50 MICROGram(s) IV Push once PRN  ondansetron Injectable 4 milliGRAM(s) IV Push once PRN  2. CV: Hemodynamically stable.  Monitor VS.  3. Pulm: Saturating well on room air.  Encourage OOB  4. GI: Advance to regular diet. Anti-emetics PRN.  5. : Voiding spontaneously   6. Electrolytes: LR@30cc/hr  7. DVT ppx w/ SCD's while in bed. Early ambulation, initially with assistance then as tolerated.   8. Discharge from PACU when criteria met.     CHRISTY Person PGY2

## 2023-04-07 NOTE — ASU DISCHARGE PLAN (ADULT/PEDIATRIC) - DRIVING
Operative Note    Patient: Sayra Bernabe 78 year old female    MRN: [id number]    Surgeon(s): Diogenes Henson MD    Assistants: * No surgical staff found *      Pre-Op Diagnosis: Hydronephrosis [N13.30]     Post-Op Diagnosis: Same     Procedure: Procedure(s):  CYSTOSCOPY,LEFT URETERAL STENT REMOVAL    Anesthesia Type: MAC                                   Complications: None    Specimens Removed: No specimens collected     Estimated Blood Loss: No Value     Assistant Tasks: None     Implants: * No implants in log *      DESCRIPTION OF PROCEDURE:  Discussed risks and benefits of proposed procedure. Patients questions and concerns addressed. Informed consent obtained. The patient was prepped and draped in usual sterile fashion.  Cystourethroscopy commenced and 23 English scope was placed in the urethra under direct vision.  The urethra was found to be normal.  Upon entering into the bladder, the ureteral stent was identified, grasped, and removed. Ureteral stent completely intact.   The patient tolerated this well without complications.  The patient returned to [facility] in stable condition.  
No...

## 2023-04-07 NOTE — ASU DISCHARGE PLAN (ADULT/PEDIATRIC) - CALL YOUR DOCTOR IF YOU HAVE ANY OF THE FOLLOWING:
Bleeding that does not stop/Swelling that gets worse/Pain not relieved by Medications/Fever greater than (need to indicate Fahrenheit or Celsius)/Wound/Surgical Site with redness, or foul smelling discharge or pus/Unable to urinate Bleeding that does not stop/Swelling that gets worse/Pain not relieved by Medications/Fever greater than (need to indicate Fahrenheit or Celsius)/Wound/Surgical Site with redness, or foul smelling discharge or pus/Nausea and vomiting that does not stop/Unable to urinate/Inability to tolerate liquids or foods/Increased irritability or sluggishness

## 2023-04-07 NOTE — BRIEF OPERATIVE NOTE - NSICDXBRIEFPOSTOP_GEN_ALL_CORE_FT
POST-OP DIAGNOSIS:  Breast cancer 07-Apr-2023 16:41:57  Artemio Hudson  Fibroid uterus 07-Apr-2023 16:41:51  Artemio Hudson

## 2023-04-07 NOTE — BRIEF OPERATIVE NOTE - OPERATION/FINDINGS
18 week size multi fibroid uterus with grossly normal bilateral fallopian tubes and ovaries  Bilateral ureters identified in respective side dumont and were dissection away for surgical sites  Cystoscopy performed at conclusion of case with evidence of bilateral UO-jets

## 2023-04-09 NOTE — HISTORY OF PRESENT ILLNESS
[Other Location: e.g. School (Enter Location, City,State)___] : at [unfilled], at the time of the visit. [FreeTextEntry1] : 47 yo P3 here w/questions about surgery.

## 2023-04-09 NOTE — DISCUSSION/SUMMARY
[FreeTextEntry1] : Multiple questions about surgery and logistics for the day answered.\par \par Pt will keep appointment for procedure.\par

## 2023-04-10 LAB — GLUCOSE BLDC GLUCOMTR-MCNC: 106 MG/DL — HIGH (ref 70–99)

## 2023-04-11 ENCOUNTER — APPOINTMENT (OUTPATIENT)
Dept: OBGYN | Facility: CLINIC | Age: 48
End: 2023-04-11

## 2023-04-17 LAB — SURGICAL PATHOLOGY STUDY: SIGNIFICANT CHANGE UP

## 2023-04-18 PROBLEM — C50.919 MALIGNANT NEOPLASM OF UNSPECIFIED SITE OF UNSPECIFIED FEMALE BREAST: Chronic | Status: ACTIVE | Noted: 2023-03-28

## 2023-04-18 PROBLEM — Z92.3 PERSONAL HISTORY OF IRRADIATION: Chronic | Status: ACTIVE | Noted: 2023-03-28

## 2023-04-20 ENCOUNTER — APPOINTMENT (OUTPATIENT)
Dept: HEMATOLOGY ONCOLOGY | Facility: CLINIC | Age: 48
End: 2023-04-20
Payer: COMMERCIAL

## 2023-04-20 PROCEDURE — 99213 OFFICE O/P EST LOW 20 MIN: CPT | Mod: 95

## 2023-04-20 RX ORDER — VALACYCLOVIR 500 MG/1
500 TABLET, FILM COATED ORAL TWICE DAILY
Qty: 6 | Refills: 3 | Status: COMPLETED | COMMUNITY
Start: 2021-03-11 | End: 2023-04-20

## 2023-04-20 RX ORDER — TRANEXAMIC ACID 650 MG/1
650 TABLET ORAL 3 TIMES DAILY
Qty: 30 | Refills: 4 | Status: COMPLETED | COMMUNITY
Start: 2022-11-30 | End: 2023-04-20

## 2023-04-20 RX ORDER — MECLIZINE HYDROCHLORIDE 25 MG/1
TABLET ORAL
Refills: 0 | Status: COMPLETED | COMMUNITY
End: 2023-04-20

## 2023-04-20 NOTE — ASSESSMENT
[FreeTextEntry1] : 48 yo woman with Stage 1A (T3gC2T8) tubular carcinoma of the left breast; also with history of PASH of the right breast; s/p lumpectomy in 11/2022; Oncotype which was low risk with recurrence score of 13; adivsed patient that there is no role for adjuvant chemotherapy in her case given low risk disease; completed adjuvant RT 2/17/2023;  advised patient that after completing RT, would recommend initiation of adjuvant endocrine therapy with either tamoxifen 20mg daily for 7-10 years or ovarian suppression and use of aromatase inhibitor for 7-10 years; \par \par - patient is s/p hysterectomy and BSO on 4/7/2023 - she is recovering well and has her first post-op visit scheduled\par - Rec: patient to start anastrozole 5/2/2023 after first post-op visit:  risk/benefits/side effects of AIs including but not limited to arthralgia, myalagia, osteoporosis and hyperlipidemia discussed with patient\par - baseline bone density will need to be discussed at upcoming visit\par - f/up scheduled 5/30/2023 w/ Dr. Lauren in office patient advised to keep that appointment\par \par Anemia - likely was secondary to heavy menstrual bleeding\par - repeat bw at 5/30/2023 visit \par - also due for screening colonoscopy \par \par Patient had an opportunity to have her questions asked and answered to her satisfaction.\par \par \par

## 2023-04-20 NOTE — END OF VISIT
[FreeTextEntry3] : Patient being seen as per physician's primary plan of care.\par \par  [Time Spent: ___ minutes] : I have spent [unfilled] minutes of time on the encounter.

## 2023-04-20 NOTE — HISTORY OF PRESENT ILLNESS
[Home] : at home, [unfilled] , at the time of the visit. [Medical Office: ([facility])___] : at the medical office located in  [Verbal consent obtained from patient] : the patient, [unfilled] [Disease: _____________________] : Disease: [unfilled] [T: ___] : T[unfilled] [N: ___] : N[unfilled] [M: ___] : M[unfilled] [AJCC Stage: ____] : AJCC Stage: [unfilled] [de-identified] : Patient who in 2021 was noted onto have Right breast PASH at 9:00 and Left Breast fibroadenomatoid and PASH at 10:00, concordant, undergoing f/u imaging in 6 months. She denies any breast symptoms prior to her imaging this year.\par \par Most recent imaging BIRAD-4B:\par 2022 B/L Diagnostic mammo (NW) TC 12.% revealed heterogeneously dense breasts \par - L UOQ architectural distortion -> Stereotactic biopsy recommended\par - B/L previously bx masses are not significantly changed and contain bx markers\par - Nodular parenchymal pattern\par \par 2022 B/L US\par - R 9:00 N14 2.1 x 1.5 x 3 cm heterogenous mass, stable from 2021 (previously bx PASH). 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 Calcs and necrosis absent. DCIS, cribriform, low grade atypia present w/ microcalcs. Smooth muscle myosin heavy chain and P63 show absence of myoepithelial cells in the infiltrative glands, support histologic dx of tubular carcinoma. IDP w/ microcalcs. \par - %, ME 90% strong, HER2 2+ Equivocal, CISH negative\par \par Preoperative MRI on 10/24/22 showed biopsy proven malignancy in Upper outer quadrant of the left breast (2.6cm); recommended wide excsision. T2 hyperintese cicumscribed mass in upper central right breast with similar appearance to other biopsy proven PASH was also noted.\par \par Genetic Testing with [employer] - negative\par \par On 11/10/22 she underwent Left breast lumpectomy and SLNB; Final Path Tubular carcinoma, [place] grade 4/9, 1.8cm in largest dimension; additionally DCIS with flat and micropappillary features was noted; all margins negative, 2 SLN removed and negative; Oncotype recurrence score 13\par \par \par Patient has also been found to have iron deficiency anemia as per her primary care physican and has started on IV iron infusions as of last week. Is suspected to have iron deficiency due to heavy menstrual periods as result of fibroids; has not had GI evaluation\par \par She was seen by Radiation Oncology earlier this morning for evaluation and is planned for adjuvant RT.\par Presents for Medical Oncology evaluation\par \par \par GYN history Menarche at 13, having heavy menstrual periods with LMP 12/10/22, , 1 miscarriage; 1st full term pregnancy at age 24; did not breastfeed  [de-identified] : %, PR90%, [id number] equivocal by IHC, CISH negative [de-identified] : 4/20/2023\par Patient returns today post radiation which was completed on 2/17/2023.  s/p TLH-BSO 4/7/2023\par Presents today to discuss adjuvant endocrine therapy \par Recovering well from surgery - post-op visit with surgeon scheduled 5/2/2023\par \par Breast Imaging: F/up Breast MRI scheduled 4/28/2023 6 month f/up\par Mammo/Sono due 10/2023

## 2023-04-28 ENCOUNTER — OUTPATIENT (OUTPATIENT)
Dept: OUTPATIENT SERVICES | Facility: HOSPITAL | Age: 48
LOS: 1 days | End: 2023-04-28
Payer: COMMERCIAL

## 2023-04-28 ENCOUNTER — APPOINTMENT (OUTPATIENT)
Dept: MRI IMAGING | Facility: IMAGING CENTER | Age: 48
End: 2023-04-28
Payer: COMMERCIAL

## 2023-04-28 DIAGNOSIS — Z90.49 ACQUIRED ABSENCE OF OTHER SPECIFIED PARTS OF DIGESTIVE TRACT: Chronic | ICD-10-CM

## 2023-04-28 DIAGNOSIS — R92.8 OTHER ABNORMAL AND INCONCLUSIVE FINDINGS ON DIAGNOSTIC IMAGING OF BREAST: ICD-10-CM

## 2023-04-28 DIAGNOSIS — Z98.890 OTHER SPECIFIED POSTPROCEDURAL STATES: Chronic | ICD-10-CM

## 2023-04-28 DIAGNOSIS — O03.9 COMPLETE OR UNSPECIFIED SPONTANEOUS ABORTION WITHOUT COMPLICATION: Chronic | ICD-10-CM

## 2023-04-28 PROCEDURE — A9585: CPT

## 2023-04-28 PROCEDURE — C8937: CPT

## 2023-04-28 PROCEDURE — 77049 MRI BREAST C-+ W/CAD BI: CPT | Mod: 26

## 2023-04-28 PROCEDURE — C8908: CPT

## 2023-05-02 ENCOUNTER — APPOINTMENT (OUTPATIENT)
Dept: OBGYN | Facility: CLINIC | Age: 48
End: 2023-05-02

## 2023-05-03 ENCOUNTER — APPOINTMENT (OUTPATIENT)
Dept: OBGYN | Facility: CLINIC | Age: 48
End: 2023-05-03
Payer: COMMERCIAL

## 2023-05-03 VITALS
WEIGHT: 201.1 LBS | SYSTOLIC BLOOD PRESSURE: 132 MMHG | HEIGHT: 62 IN | HEART RATE: 80 BPM | DIASTOLIC BLOOD PRESSURE: 84 MMHG | BODY MASS INDEX: 37.01 KG/M2

## 2023-05-03 PROCEDURE — 99024 POSTOP FOLLOW-UP VISIT: CPT

## 2023-05-08 NOTE — HISTORY OF PRESENT ILLNESS
[FreeTextEntry1] : 47 yo P3 here for PO check after  TLH BSO 4/7/23 for fibroids.\par No major complaints.\par Pt still followed by Heme Onc for breast ca.

## 2023-05-08 NOTE — DISCUSSION/SUMMARY
[FreeTextEntry1] : 49 yo P3 here for PO check after TLH BSO 4/7/23 for fibroids and h/o breast ca\par Pt recovering well.\par Path benign. \par \par Plan\par RTO for second PO check.\par Pt will continue to follow w/Heme Onc for breast ca.

## 2023-05-16 ENCOUNTER — OUTPATIENT (OUTPATIENT)
Dept: OUTPATIENT SERVICES | Facility: HOSPITAL | Age: 48
LOS: 1 days | Discharge: HOME | End: 2023-05-16

## 2023-05-16 DIAGNOSIS — R92.8 OTHER ABNORMAL AND INCONCLUSIVE FINDINGS ON DIAGNOSTIC IMAGING OF BREAST: ICD-10-CM

## 2023-05-16 DIAGNOSIS — Z98.890 OTHER SPECIFIED POSTPROCEDURAL STATES: Chronic | ICD-10-CM

## 2023-05-16 DIAGNOSIS — O03.9 COMPLETE OR UNSPECIFIED SPONTANEOUS ABORTION WITHOUT COMPLICATION: Chronic | ICD-10-CM

## 2023-05-16 DIAGNOSIS — Z90.49 ACQUIRED ABSENCE OF OTHER SPECIFIED PARTS OF DIGESTIVE TRACT: Chronic | ICD-10-CM

## 2023-05-25 ENCOUNTER — APPOINTMENT (OUTPATIENT)
Dept: OBGYN | Facility: CLINIC | Age: 48
End: 2023-05-25
Payer: COMMERCIAL

## 2023-05-25 VITALS — DIASTOLIC BLOOD PRESSURE: 81 MMHG | SYSTOLIC BLOOD PRESSURE: 120 MMHG | HEIGHT: 62 IN | HEART RATE: 73 BPM

## 2023-05-25 DIAGNOSIS — D25.9 LEIOMYOMA OF UTERUS, UNSPECIFIED: ICD-10-CM

## 2023-05-25 PROCEDURE — 99024 POSTOP FOLLOW-UP VISIT: CPT

## 2023-05-28 PROBLEM — D25.9 FIBROID UTERUS: Status: ACTIVE | Noted: 2017-01-06

## 2023-05-28 NOTE — HISTORY OF PRESENT ILLNESS
[FreeTextEntry1] : 49 yo P3 here for second PO check after TLH BSO for fibroids and h/o ER/VT+ breast ca on 4/7/23.\par No major complaints.\par \par She would like a letter saying that she may return to work: "Full duty no restrictions"\par

## 2023-05-28 NOTE — DISCUSSION/SUMMARY
[FreeTextEntry1] : 47 yo P3 here for second PO check after TLH BSO for fibroids and h/o ER/IA+ breast ca on 4/7/23.\par Pt recovering well.\par Path benign.\par She would like a letter saying that she may return to work: "Full duty no restrictions"\par I explained that typical return to work after this procedure would be 2-3 wks.\par \par Plan \par Pt to f/u w/Dr. Sherie Mcgregor for routine GYN care\par

## 2023-05-30 ENCOUNTER — APPOINTMENT (OUTPATIENT)
Dept: HEMATOLOGY ONCOLOGY | Facility: CLINIC | Age: 48
End: 2023-05-30
Payer: COMMERCIAL

## 2023-05-30 VITALS
TEMPERATURE: 97 F | RESPIRATION RATE: 16 BRPM | OXYGEN SATURATION: 99 % | SYSTOLIC BLOOD PRESSURE: 129 MMHG | WEIGHT: 209.44 LBS | DIASTOLIC BLOOD PRESSURE: 84 MMHG | BODY MASS INDEX: 38.31 KG/M2 | HEART RATE: 96 BPM

## 2023-05-30 DIAGNOSIS — N63.0 UNSPECIFIED LUMP IN UNSPECIFIED BREAST: ICD-10-CM

## 2023-05-30 PROCEDURE — 99214 OFFICE O/P EST MOD 30 MIN: CPT

## 2023-05-30 NOTE — REVIEW OF SYSTEMS
[Fatigue] : fatigue [Dysmenorrhea/Abn Vaginal Bleeding] : no dysmenorrhea/abnormal vaginal bleeding [Negative] : Genitourinary [de-identified] : s/p FITZ/BSO; denies hot flushes

## 2023-05-30 NOTE — PHYSICAL EXAM
[Normal] : affect appropriate [de-identified] : overweight - BMI 38 [de-identified] : left breast with well healed incision; noted intradermal edema and post radiation changes; no palpable masses in either breast

## 2023-05-30 NOTE — ASSESSMENT
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; also with history of PASH of the right breast; s/p lumpectomy in 11/2022; Oncotype which was low risk with recurrence score of 13; advised patient that there is no role for adjuvant chemotherapy in her case given low risk disease; completed adjuvant RT 2/17/2023;  advised patient that after completing RT, would recommend initiation of adjuvant endocrine therapy with either tamoxifen 20mg daily for 7-10 years or ovarian suppression and use of aromatase inhibitor for 7-10 years; She opted to undergo hysterectomy and BSO on 4/7/2023 - she has recovered well\par \par - Rec: patient was to start anastrozole 5/2/2023 but opted to hold off as she did not feel fully recovered from surgery; plans to start medication on 6/1/23\par - risk/benefits/side effects of AIs including but not limited to arthralgia, myalagia, osteoporosis and hyperlipidemia discussed with patient\par - baseline bone density ordered\par \par Anemia - likely was secondary to heavy menstrual bleeding\par - held off labs today as patient has not started medication; will check chem panel and CBC in 6 months or sooner if symptomatic\par - also due for screening colonoscopy \par \par Patient had an opportunity to have her questions asked and answered to her satisfaction.\par f/u in 6 months\par advised to call if she is having any side effects with anastrazole\par \par \par

## 2023-05-30 NOTE — HISTORY OF PRESENT ILLNESS
[Disease: _____________________] : Disease: [unfilled] [T: ___] : T[unfilled] [N: ___] : N[unfilled] [M: ___] : M[unfilled] [AJCC Stage: ____] : AJCC Stage: [unfilled] [100: Normal, no complaints, no evidence of disease.] : 100: Normal, no complaints, no evidence of disease. [de-identified] : %, PR90%, [id number] equivocal by IHC, CISH negative [de-identified] : Patient who in 2021 was noted onto have Right breast PASH at 9:00 and Left Breast fibroadenomatoid and PASH at 10:00, concordant, undergoing f/u imaging in 6 months. She denies any breast symptoms prior to her imaging this year.\par \par Most recent imaging BIRAD-4B:\par 2022 B/L Diagnostic mammo (NW) TC 12.% revealed heterogeneously dense breasts \par - L UOQ architectural distortion -> Stereotactic biopsy recommended\par - B/L previously bx masses are not significantly changed and contain bx markers\par - Nodular parenchymal pattern\par \par 2022 B/L US\par - R 9:00 N14 2.1 x 1.5 x 3 cm heterogenous mass, stable from 2021 (previously bx PASH). 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 Calcs and necrosis absent. DCIS, cribriform, low grade atypia present w/ microcalcs. Smooth muscle myosin heavy chain and P63 show absence of myoepithelial cells in the infiltrative glands, support histologic dx of tubular carcinoma. IDP w/ microcalcs. \par - %, MI 90% strong, HER2 2+ Equivocal, CISH negative\par \par Preoperative MRI on 10/24/22 showed biopsy proven malignancy in Upper outer quadrant of the left breast (2.6cm); recommended wide excsision. T2 hyperintese cicumscribed mass in upper central right breast with similar appearance to other biopsy proven PASH was also noted.\par \par Genetic Testing with [employer] - negative\par \par On 11/10/22 she underwent Left breast lumpectomy and SLNB; Final Path Tubular carcinoma, [place] grade 4/9, 1.8cm in largest dimension; additionally DCIS with flat and micropappillary features was noted; all margins negative, 2 SLN removed and negative; Oncotype recurrence score 13\par \par \par Patient has also been found to have iron deficiency anemia as per her primary care physican and has started on IV iron infusions as of last week. Is suspected to have iron deficiency due to heavy menstrual periods as result of fibroids; has not had GI evaluation\par \par She was seen by Radiation Oncology and completed adjuvant RT 2023.\par Delayed initiation of endocrine therapy as she underwent hysterectomy with BSO on 23\par \par \par GYN history Menarche at 13, having heavy menstrual periods with LMP 12/10/22, , 1 miscarriage; 1st full term pregnancy at age 24; did not breastfeed  [FreeTextEntry1] : FITZ-BSO - 4/7/23\par Anastrazole - 4/20/23 [de-identified] : 5/30/23\par Patient returns today to rule out progression or recurrence of breast cancer and to assess treatment toxicity. anastrazole\par On 4/20/23 seen by our office and advised to  initiate endocrine therapy with AI after fully healing from surgery ; underwent TLH-BSO on 4/7/2023; recovered from surgery at this point; plans to start AI on 6/1/23\par \par Breast Imaging: MRI 4/28/2023, less conspicuous circumscribed enhancing mass in the center upper right breast; BIRADS-3 with 6 month f/up\par Mammo/Sono due 10/2023 \par BMD - due now as she is post-menopausal and to start high risk medication

## 2023-08-10 ENCOUNTER — APPOINTMENT (OUTPATIENT)
Dept: RADIOLOGY | Facility: CLINIC | Age: 48
End: 2023-08-10
Payer: COMMERCIAL

## 2023-08-10 ENCOUNTER — OUTPATIENT (OUTPATIENT)
Dept: OUTPATIENT SERVICES | Facility: HOSPITAL | Age: 48
LOS: 1 days | End: 2023-08-10
Payer: COMMERCIAL

## 2023-08-10 DIAGNOSIS — Z90.49 ACQUIRED ABSENCE OF OTHER SPECIFIED PARTS OF DIGESTIVE TRACT: Chronic | ICD-10-CM

## 2023-08-10 DIAGNOSIS — Z98.890 OTHER SPECIFIED POSTPROCEDURAL STATES: Chronic | ICD-10-CM

## 2023-08-10 DIAGNOSIS — O03.9 COMPLETE OR UNSPECIFIED SPONTANEOUS ABORTION WITHOUT COMPLICATION: Chronic | ICD-10-CM

## 2023-08-10 DIAGNOSIS — Z79.899 OTHER LONG TERM (CURRENT) DRUG THERAPY: ICD-10-CM

## 2023-08-10 PROCEDURE — 77080 DXA BONE DENSITY AXIAL: CPT

## 2023-08-10 PROCEDURE — 77080 DXA BONE DENSITY AXIAL: CPT | Mod: 26

## 2023-08-24 ENCOUNTER — TRANSCRIPTION ENCOUNTER (OUTPATIENT)
Age: 48
End: 2023-08-24

## 2023-08-29 ENCOUNTER — OUTPATIENT (OUTPATIENT)
Dept: OUTPATIENT SERVICES | Facility: HOSPITAL | Age: 48
LOS: 1 days | Discharge: HOME | End: 2023-08-29

## 2023-08-29 DIAGNOSIS — O03.9 COMPLETE OR UNSPECIFIED SPONTANEOUS ABORTION WITHOUT COMPLICATION: Chronic | ICD-10-CM

## 2023-08-29 DIAGNOSIS — Z98.890 OTHER SPECIFIED POSTPROCEDURAL STATES: Chronic | ICD-10-CM

## 2023-08-29 DIAGNOSIS — R92.8 OTHER ABNORMAL AND INCONCLUSIVE FINDINGS ON DIAGNOSTIC IMAGING OF BREAST: ICD-10-CM

## 2023-08-29 DIAGNOSIS — Z90.49 ACQUIRED ABSENCE OF OTHER SPECIFIED PARTS OF DIGESTIVE TRACT: Chronic | ICD-10-CM

## 2023-09-05 ENCOUNTER — APPOINTMENT (OUTPATIENT)
Dept: HEMATOLOGY ONCOLOGY | Facility: CLINIC | Age: 48
End: 2023-09-05
Payer: COMMERCIAL

## 2023-09-05 VITALS
OXYGEN SATURATION: 99 % | SYSTOLIC BLOOD PRESSURE: 130 MMHG | TEMPERATURE: 97.1 F | RESPIRATION RATE: 16 BRPM | BODY MASS INDEX: 38.99 KG/M2 | WEIGHT: 213.18 LBS | HEART RATE: 83 BPM | DIASTOLIC BLOOD PRESSURE: 86 MMHG

## 2023-09-05 DIAGNOSIS — Z79.899 OTHER LONG TERM (CURRENT) DRUG THERAPY: ICD-10-CM

## 2023-09-05 DIAGNOSIS — M89.8X9 OTHER SPECIFIED DISORDERS OF BONE, UNSPECIFIED SITE: ICD-10-CM

## 2023-09-05 DIAGNOSIS — M89.8X1 OTHER SPECIFIED DISORDERS OF BONE, SHOULDER: ICD-10-CM

## 2023-09-05 DIAGNOSIS — R92.8 OTHER ABNORMAL AND INCONCLUSIVE FINDINGS ON DIAGNOSTIC IMAGING OF BREAST: ICD-10-CM

## 2023-09-05 PROCEDURE — 99214 OFFICE O/P EST MOD 30 MIN: CPT

## 2023-09-06 ENCOUNTER — APPOINTMENT (OUTPATIENT)
Dept: ORTHOPEDIC SURGERY | Facility: CLINIC | Age: 48
End: 2023-09-06

## 2023-09-07 NOTE — REVIEW OF SYSTEMS
[Fatigue] : fatigue [Dysmenorrhea/Abn Vaginal Bleeding] : no dysmenorrhea/abnormal vaginal bleeding [Negative] : Allergic/Immunologic [de-identified] : s/p FITZ/BSO; denies hot flushes

## 2023-09-07 NOTE — PHYSICAL EXAM
[Normal] : affect appropriate [de-identified] : overweight - BMI 38 [de-identified] : left breast with well healed incision; noted intradermal edema and post radiation changes; no palpable masses in either breast [de-identified] : restricted range of motion and discomfort of left upper extremity with extension

## 2023-09-07 NOTE — ASSESSMENT
[FreeTextEntry1] : 49yo woman with Stage 1A ([id number]) tubular carcinoma of the left breast; also with history of PASH of the right breast; s/p lumpectomy in 11/2022; Oncotype which was low risk with recurrence score of 13; advised patient that there is no role for adjuvant chemotherapy in her case given low risk disease; completed adjuvant RT 2/17/2023;  advised patient that after completing RT, would recommend initiation of adjuvant endocrine therapy with either tamoxifen 20mg daily for 7-10 years or ovarian suppression and use of aromatase inhibitor for 7-10 years; She opted to undergo hysterectomy and BSO on 4/7/2023 - she has recovered well  - Rec:Started anastrozole 1 mg daily on 6/1/2023 - developed worsening arthralgias.  Stopped one week ago and notes some improvement in arthralgias, but still bothersome.   - xrays b/l shoulders were unremarkable (report from [facility] reviewed) - Arthalgias/bone pain are likely secondary to a combination of anastrozole and low estrogen state - will obtain a bone scan to r/o mets.   - since there has been some improvement off of anastrozole; will change to exemestane 25 mg daily starting on 9/11/2023 - Rec: PT for left shoulder ROM since breast surgery.  - annual mammo/sono due 10/2023 - 6 month f/up Breast MRI due 10/2023 also - ordered - f/up with Dr. Troncoso and Dr. Lainez as recommended - bw ordered today   PMD at least annually with lipid checks/bloodwork, gyn at least annually and PAP test screening per their recommendations, colon cancer screening/colonoscopy at least every 10 years as recommended by PMD/GI , dermatology for annual skin checks  ophthalmology for annual eye exams - also due for screening colonoscopy   Patient had an opportunity to have her questions asked and answered to her satisfaction. f/u in 3 months

## 2023-09-07 NOTE — HISTORY OF PRESENT ILLNESS
[Disease: _____________________] : Disease: [unfilled] [T: ___] : T[unfilled] [N: ___] : N[unfilled] [M: ___] : M[unfilled] [AJCC Stage: ____] : AJCC Stage: [unfilled] [de-identified] : Patient who in 2021 was noted onto have Right breast PASH at 9:00 and Left Breast fibroadenomatoid and PASH at 10:00, concordant, undergoing f/u imaging in 6 months. She denies any breast symptoms prior to her imaging this year.\par  \par  Most recent imaging BIRAD-4B:\par  2022 B/L Diagnostic mammo (NW) TC 12.% revealed heterogeneously dense breasts \par  - L UOQ architectural distortion -> Stereotactic biopsy recommended\par  - B/L previously bx masses are not significantly changed and contain bx markers\par  - Nodular parenchymal pattern\par  \par  2022 B/L US\par  - R 9:00 N14 2.1 x 1.5 x 3 cm heterogenous mass, stable from 2021 (previously bx PASH). 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 Calcs and necrosis absent. DCIS, cribriform, low grade atypia present w/ microcalcs. Smooth muscle myosin heavy chain and P63 show absence of myoepithelial cells in the infiltrative glands, support histologic dx of tubular carcinoma. IDP w/ microcalcs. \par  - %, HI 90% strong, HER2 2+ Equivocal, CISH negative\par  \par  Preoperative MRI on 10/24/22 showed biopsy proven malignancy in Upper outer quadrant of the left breast (2.6cm); recommended wide excsision. T2 hyperintese cicumscribed mass in upper central right breast with similar appearance to other biopsy proven PASH was also noted.\par  \par  Genetic Testing with [employer] - negative\par  \par  On 11/10/22 she underwent Left breast lumpectomy and SLNB; Final Path Tubular carcinoma, [place] grade 4/9, 1.8cm in largest dimension; additionally DCIS with flat and micropappillary features was noted; all margins negative, 2 SLN removed and negative; Oncotype recurrence score 13\par  \par  \par  Patient has also been found to have iron deficiency anemia as per her primary care physican and has started on IV iron infusions as of last week. Is suspected to have iron deficiency due to heavy menstrual periods as result of fibroids; has not had GI evaluation\par  \par  She was seen by Radiation Oncology and completed adjuvant RT 2023.\par  Delayed initiation of endocrine therapy as she underwent hysterectomy with BSO on 23\par  \par  \par  GYN history Menarche at 13, having heavy menstrual periods with LMP 12/10/22, , 1 miscarriage; 1st full term pregnancy at age 24; did not breastfeed  [de-identified] : %, PR90%, [id number] equivocal by IHC, CISH negative [FreeTextEntry1] : FITZ-BSO - 4/7/23\par  Anastrazole - 4/20/23 [de-identified] : 9/5/2023 Patient returns today to rule out progression or recurrence of breast cancer and to assess treatment toxicity. anastrazole On 4/20/23 seen by our office and advised to  initiate endocrine therapy with AI after fully healing from surgery ; underwent TLH-BSO on 4/7/2023; recovered from surgery at this point; started AI on 6/1/23 kiana has developed worsening arthralgias over last week - noted in hands, feet and b/l shoulders. She also has restricted ROM of her left shoulder which has been present since surgery. She states that the arthralgias were present since oopherectomy but worsened more recently.  she stopped anastrozole one week ago and notes some improvement.   KIANA  denies any breast masses, breast tenderness, skin changes or nipple discharge. She denies any SOB, CP, abdominal pain, bone pain, headache, or unexplained weight loss; she notes difficulty losing weight  Breast Imaging: MRI 4/28/2023, less conspicuous circumscribed enhancing mass in the center upper right breast; BIRADS-3 with 6 month f/up Mammo/Sono due 10/2023  BMD - 8/10/2023 - normal [100: Normal, no complaints, no evidence of disease.] : 100: Normal, no complaints, no evidence of disease.

## 2023-10-02 PROBLEM — Z00.00 ENCOUNTER FOR PREVENTIVE HEALTH EXAMINATION: Status: ACTIVE | Noted: 2023-10-02

## 2023-10-04 ENCOUNTER — OUTPATIENT (OUTPATIENT)
Dept: OUTPATIENT SERVICES | Facility: HOSPITAL | Age: 48
LOS: 1 days | End: 2023-10-04
Payer: COMMERCIAL

## 2023-10-04 ENCOUNTER — APPOINTMENT (OUTPATIENT)
Dept: NUCLEAR MEDICINE | Facility: IMAGING CENTER | Age: 48
End: 2023-10-04

## 2023-10-04 ENCOUNTER — RESULT REVIEW (OUTPATIENT)
Age: 48
End: 2023-10-04

## 2023-10-04 DIAGNOSIS — Z98.890 OTHER SPECIFIED POSTPROCEDURAL STATES: Chronic | ICD-10-CM

## 2023-10-04 DIAGNOSIS — M89.8X9 OTHER SPECIFIED DISORDERS OF BONE, UNSPECIFIED SITE: ICD-10-CM

## 2023-10-04 DIAGNOSIS — Z90.49 ACQUIRED ABSENCE OF OTHER SPECIFIED PARTS OF DIGESTIVE TRACT: Chronic | ICD-10-CM

## 2023-10-04 DIAGNOSIS — O03.9 COMPLETE OR UNSPECIFIED SPONTANEOUS ABORTION WITHOUT COMPLICATION: Chronic | ICD-10-CM

## 2023-10-04 PROCEDURE — 78830 RP LOCLZJ TUM SPECT W/CT 1: CPT

## 2023-10-04 PROCEDURE — A9561: CPT

## 2023-10-04 PROCEDURE — 78306 BONE IMAGING WHOLE BODY: CPT

## 2023-10-06 ENCOUNTER — APPOINTMENT (OUTPATIENT)
Dept: MRI IMAGING | Facility: CLINIC | Age: 48
End: 2023-10-06

## 2023-10-19 ENCOUNTER — RESULT REVIEW (OUTPATIENT)
Age: 48
End: 2023-10-19

## 2023-10-19 ENCOUNTER — APPOINTMENT (OUTPATIENT)
Dept: MRI IMAGING | Facility: IMAGING CENTER | Age: 48
End: 2023-10-19

## 2023-10-19 ENCOUNTER — OUTPATIENT (OUTPATIENT)
Dept: OUTPATIENT SERVICES | Facility: HOSPITAL | Age: 48
LOS: 1 days | End: 2023-10-19
Payer: COMMERCIAL

## 2023-10-19 DIAGNOSIS — Z98.890 OTHER SPECIFIED POSTPROCEDURAL STATES: Chronic | ICD-10-CM

## 2023-10-19 DIAGNOSIS — Z00.8 ENCOUNTER FOR OTHER GENERAL EXAMINATION: ICD-10-CM

## 2023-10-19 DIAGNOSIS — Z90.49 ACQUIRED ABSENCE OF OTHER SPECIFIED PARTS OF DIGESTIVE TRACT: Chronic | ICD-10-CM

## 2023-10-19 PROCEDURE — C8937: CPT

## 2023-10-19 PROCEDURE — C8908: CPT

## 2023-10-19 PROCEDURE — A9585: CPT

## 2023-10-19 PROCEDURE — 77049 MRI BREAST C-+ W/CAD BI: CPT | Mod: 26

## 2023-10-27 NOTE — ASU PATIENT PROFILE, ADULT - TEACHING/LEARNING FACTORS INFLUENCE READINESS TO LEARN
Medication reconciliation completed.  Patient was unable to provide medication information, spoke to wife Nadia and they provided current medication list; confirmed with Dr. [facility].  none

## 2023-11-02 ENCOUNTER — APPOINTMENT (OUTPATIENT)
Dept: ULTRASOUND IMAGING | Facility: CLINIC | Age: 48
End: 2023-11-02

## 2023-11-02 ENCOUNTER — RESULT REVIEW (OUTPATIENT)
Age: 48
End: 2023-11-02

## 2023-11-02 ENCOUNTER — OUTPATIENT (OUTPATIENT)
Dept: OUTPATIENT SERVICES | Facility: HOSPITAL | Age: 48
LOS: 1 days | End: 2023-11-02
Payer: COMMERCIAL

## 2023-11-02 ENCOUNTER — APPOINTMENT (OUTPATIENT)
Dept: MAMMOGRAPHY | Facility: CLINIC | Age: 48
End: 2023-11-02

## 2023-11-02 DIAGNOSIS — Z90.49 ACQUIRED ABSENCE OF OTHER SPECIFIED PARTS OF DIGESTIVE TRACT: Chronic | ICD-10-CM

## 2023-11-02 DIAGNOSIS — Z98.890 OTHER SPECIFIED POSTPROCEDURAL STATES: Chronic | ICD-10-CM

## 2023-11-02 DIAGNOSIS — C50.919 MALIGNANT NEOPLASM OF UNSPECIFIED SITE OF UNSPECIFIED FEMALE BREAST: ICD-10-CM

## 2023-11-02 PROCEDURE — 76641 ULTRASOUND BREAST COMPLETE: CPT | Mod: 26,50

## 2023-11-02 PROCEDURE — 76641 ULTRASOUND BREAST COMPLETE: CPT

## 2023-11-02 PROCEDURE — G0279: CPT

## 2023-11-02 PROCEDURE — 77066 DX MAMMO INCL CAD BI: CPT

## 2023-11-02 PROCEDURE — 77066 DX MAMMO INCL CAD BI: CPT | Mod: 26

## 2023-11-02 PROCEDURE — G0279: CPT | Mod: 26

## 2023-11-03 ENCOUNTER — APPOINTMENT (OUTPATIENT)
Dept: ULTRASOUND IMAGING | Facility: CLINIC | Age: 48
End: 2023-11-03

## 2023-11-03 ENCOUNTER — APPOINTMENT (OUTPATIENT)
Dept: SURGICAL ONCOLOGY | Facility: CLINIC | Age: 48
End: 2023-11-03
Payer: COMMERCIAL

## 2023-11-03 ENCOUNTER — APPOINTMENT (OUTPATIENT)
Dept: MAMMOGRAPHY | Facility: CLINIC | Age: 48
End: 2023-11-03

## 2023-11-03 VITALS
TEMPERATURE: 97.7 F | SYSTOLIC BLOOD PRESSURE: 157 MMHG | HEART RATE: 104 BPM | RESPIRATION RATE: 16 BRPM | DIASTOLIC BLOOD PRESSURE: 99 MMHG | OXYGEN SATURATION: 96 %

## 2023-11-03 PROCEDURE — 99213 OFFICE O/P EST LOW 20 MIN: CPT

## 2023-11-03 RX ORDER — ANASTROZOLE TABLETS 1 MG/1
1 TABLET ORAL
Qty: 30 | Refills: 2 | Status: DISCONTINUED | COMMUNITY
Start: 2023-04-20 | End: 2023-11-03

## 2023-11-06 ENCOUNTER — TRANSCRIPTION ENCOUNTER (OUTPATIENT)
Age: 48
End: 2023-11-06

## 2023-11-06 RX ORDER — EXEMESTANE 25 MG/1
25 TABLET, FILM COATED ORAL DAILY
Qty: 30 | Refills: 3 | Status: COMPLETED | COMMUNITY
Start: 2023-09-05 | End: 2023-11-06

## 2023-11-15 ENCOUNTER — TRANSCRIPTION ENCOUNTER (OUTPATIENT)
Age: 48
End: 2023-11-15

## 2023-12-13 ENCOUNTER — OUTPATIENT (OUTPATIENT)
Dept: OUTPATIENT SERVICES | Facility: HOSPITAL | Age: 48
LOS: 1 days | Discharge: HOME | End: 2023-12-13

## 2023-12-13 DIAGNOSIS — O03.9 COMPLETE OR UNSPECIFIED SPONTANEOUS ABORTION WITHOUT COMPLICATION: Chronic | ICD-10-CM

## 2023-12-13 DIAGNOSIS — Z98.890 OTHER SPECIFIED POSTPROCEDURAL STATES: Chronic | ICD-10-CM

## 2023-12-13 DIAGNOSIS — Z90.49 ACQUIRED ABSENCE OF OTHER SPECIFIED PARTS OF DIGESTIVE TRACT: Chronic | ICD-10-CM

## 2023-12-13 DIAGNOSIS — R92.8 OTHER ABNORMAL AND INCONCLUSIVE FINDINGS ON DIAGNOSTIC IMAGING OF BREAST: ICD-10-CM

## 2023-12-26 ENCOUNTER — APPOINTMENT (OUTPATIENT)
Dept: HEMATOLOGY ONCOLOGY | Facility: CLINIC | Age: 48
End: 2023-12-26

## 2024-01-11 ENCOUNTER — APPOINTMENT (OUTPATIENT)
Dept: OBGYN | Facility: CLINIC | Age: 49
End: 2024-01-11
Payer: COMMERCIAL

## 2024-01-11 VITALS
HEIGHT: 62 IN | DIASTOLIC BLOOD PRESSURE: 89 MMHG | SYSTOLIC BLOOD PRESSURE: 139 MMHG | WEIGHT: 209 LBS | BODY MASS INDEX: 38.46 KG/M2

## 2024-01-11 DIAGNOSIS — Z11.3 ENCOUNTER FOR SCREENING FOR INFECTIONS WITH A PREDOMINANTLY SEXUAL MODE OF TRANSMISSION: ICD-10-CM

## 2024-01-11 DIAGNOSIS — Z01.419 ENCOUNTER FOR GYNECOLOGICAL EXAMINATION (GENERAL) (ROUTINE) W/OUT ABNORMAL FINDINGS: ICD-10-CM

## 2024-01-11 PROCEDURE — 99396 PREV VISIT EST AGE 40-64: CPT

## 2024-01-11 RX ORDER — EMOLLIENT COMBINATION NO.10
EMULSION (GRAM) TOPICAL TWICE DAILY
Qty: 1 | Refills: 1 | Status: COMPLETED | COMMUNITY
Start: 2023-02-17 | End: 2024-01-11

## 2024-01-11 RX ORDER — LETROZOLE TABLETS 2.5 MG/1
2.5 TABLET, FILM COATED ORAL
Qty: 30 | Refills: 5 | Status: COMPLETED | COMMUNITY
Start: 2023-11-06 | End: 2024-01-11

## 2024-01-11 NOTE — HISTORY OF PRESENT ILLNESS
[TextBox_4] : 47yo presents for annual exam s/p lap hyst, BSO 2023 Breast ca - follows with breast surgeon/onc  [Mammogramdate] : 2023 [PapSmeardate] : 2022

## 2024-01-11 NOTE — PHYSICAL EXAM
[Chaperone Present] : A chaperone was present in the examining room during all aspects of the physical examination [Appropriately responsive] : appropriately responsive [Alert] : alert [No Acute Distress] : no acute distress [Soft] : soft [Non-tender] : non-tender [Non-distended] : non-distended [Oriented x3] : oriented x3 [FreeTextEntry6] : orange peel changes left nipple - pt states from radiation - states hemeonc and breast surgeon have both evaluated  [Examination Of The Breasts] : a normal appearance [Peau D'Orange In The Left Breast] : Peau [place]'Orange changes [No Masses] : no breast masses were palpable [Labia Majora] : normal [Labia Minora] : normal [Normal] : normal [Absent] : absent [Uterine Adnexae] : absent

## 2024-01-12 LAB
C TRACH RRNA SPEC QL NAA+PROBE: NOT DETECTED
N GONORRHOEA RRNA SPEC QL NAA+PROBE: NOT DETECTED
SOURCE AMPLIFICATION: NORMAL
SOURCE AMPLIFICATION: NORMAL
T VAGINALIS RRNA SPEC QL NAA+PROBE: NOT DETECTED
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
## 2024-03-18 ENCOUNTER — NON-APPOINTMENT (OUTPATIENT)
Age: 49
End: 2024-03-18

## 2024-03-19 ENCOUNTER — NON-APPOINTMENT (OUTPATIENT)
Age: 49
End: 2024-03-19

## 2024-03-19 NOTE — H&P PST ADULT - NS PRO ABUSE SCREEN SUSPICION NEGLECT YN
Patient: Gunnar Sanchez Date:3/19/2024   : 1953 Attending: Sanjuanita Manrique DO   70 year old male        Chief complaint: BRIANNE  Reason for consult:  BRIANNE, hyponatremia      Initial Consult HPI:  This is a 70 year old male with no significant past medical history, daily alcohol, smoker, presented to the emergency department with acute abdominal pain right-sided chest pain with fatigue for 1 week. On 24  Chest x-ray did shows under diaphragm CT scan of the abdomen was significant for perforated viscus surgery consulted and underwent exploratory laparotomy.  Patient creatinine admission was 3.5 worsening to 4.7.   CT of the abdomen negative for hydronephrosis  He was acidotic and hyperkalemic  Nephrology consulted for acute kidney injury..    Cr improved to 0.8 Na 139 24  Patient transferrred to rehab 3/1/24     Over the last 2-3 days he has had rising WBC, acute febrile episodes, rising creatinine.  Patient was then transferred to the acute setting and started on IV antibiotics (PipTazo).  Cultures have remained negative to date.       Cr increased to 2.8 3/11/24  Nephrology reconsulted  Patient transferred out o rehab back to medicine floor  Spiked 102.9 F last night, Cr >5  HCO 10    Interval History / Subjective:  24  No nausea, vomiting. Reports he has had good appetite. Slept well. Remains anuric.  HD yesterday without complications, removal of 2 kg fluid.    Past Medical History:   Diagnosis Date    Adhesive capsulitis of shoulder     left       Past Surgical History:   Procedure Laterality Date    Exploratory of abdomen  2024    Exploratory Laparotomy, Evacuation of Hematoma - Dr. Thiago Dale    Remvl colon/term ileum/ileocolostomy  2024    Exploratory Laparotomy, Right Hemicolectomy, Abdominal Lavage, Temporary ABThera Membrane Closure - Dr. Thiago Dale    Unlisted procedure colon  2024    Planned Second-Look Laparotomy, Ileocolonic Anastomosis, Diverting  Loop Ileostomy, Fascial Closure, Vac Placement - Dr. Thiago Dale       Social History     Socioeconomic History    Marital status: /Civil Union     Spouse name: Not on file    Number of children: Not on file    Years of education: Not on file    Highest education level: Not on file   Occupational History    Not on file   Tobacco Use    Smoking status: Some Days     Types: Cigars    Smokeless tobacco: Never    Tobacco comments:     occasional   Vaping Use    Vaping Use: never used   Substance and Sexual Activity    Alcohol use: Yes     Comment: daily, 3-4 beers    Drug use: Never    Sexual activity: Yes   Other Topics Concern    Not on file   Social History Narrative    Not on file     Social Determinants of Health     Financial Resource Strain: Low Risk  (3/17/2024)    Financial Resource Strain     Unable to Get: None   Food Insecurity: Low Risk  (3/17/2024)    Food Insecurity     Worried about Food: Never true     Food is Gone: Never true   Transportation Needs: Not At Risk (3/17/2024)    Transportation Needs     Lack of Reliable Transportation: No   Physical Activity: Not on file   Stress: Not on file   Social Connections: Low Risk  (3/17/2024)    Social Connections     Social Connectivity: 5 or more times a week   Interpersonal Safety: Low Risk  (3/17/2024)    Interpersonal Safety     How often physically hurt: Never     How often insulted or talked down to: Never     How often threatened with harm: Never     How often scream or curse at: Never       Family History   Problem Relation Age of Onset    Heart Father         enlarged heart       ALLERGIES:  No Known Allergies      Review of Systems:  Positives stated above in HPI.  Full ROS completed and is otherwise negative.       Vital Last Value 24 Hour Range   Temperature 97.3 °F (36.3 °C) Temp  Min: 97.3 °F (36.3 °C)  Max: 97.7 °F (36.5 °C)   Pulse (!) 56 Pulse  Min: 51  Max: 77   Respiratory 18 Resp  Min: 14  Max: 18   Blood Pressure (!) 143/60 BP   Min: 115/65  Max: 143/60   Art BP   No data recorded   Pulse Oximetry 97 % SpO2  Min: 94 %  Max: 100 %     Vital Today Admitted   Weight 57.8 kg (127 lb 6.8 oz) Weight: 48.2 kg (106 lb 4.2 oz)   Height N/A Height: 5' 8\" (172.7 cm)   BMI N/A BMI (Calculated): 16.16     Weight over the past 48 Hours:  Patient Vitals for the past 48 hrs:   Weight   03/18/24 0445 57.7 kg (127 lb 3.3 oz)   03/19/24 0437 57.8 kg (127 lb 6.8 oz)          Hemodynamics:      Last Value 24 Hour Range   CVP   No data recorded   PAS/PAD   No data recorded   PCWP   No data recorded   CO   No data recorded   CI   No data recorded   SVR   No data recorded   SV02   No data recorded     Intake/Output:    Last Stool Occurrence:      No intake/output data recorded.    I/O last 3 completed shifts:  In: 750 [P.O.:750]  Out: 2975 [Other:2000; Stool:975]      Intake/Output Summary (Last 24 hours) at 3/19/2024 0810  Last data filed at 3/19/2024 0644  Gross per 24 hour   Intake 750 ml   Output 2975 ml   Net -2225 ml         Medications/Infusions:  Medications Prior to Admission   Medication Sig Dispense Refill    AMIODarone (PACERONE) 200 MG tablet Take 1 tablet by mouth daily. Do not start before March 2, 2024.      psyllium (METAMUCIL MULTIHEALTH FIBER) 58.12 % packet for oral suspension Take 1 packet by mouth daily. Do not start before March 2, 2024.      ferrous sulfate 325 (65 FE) MG tablet Take 1 tablet by mouth daily (with breakfast). Do not start before March 2, 2024.      pantoprazole (PROTONIX) 40 MG tablet Take 1 tablet by mouth daily (before breakfast).       Current Facility-Administered Medications   Medication Dose Route Frequency Provider Last Rate Last Admin    sodium chloride 0.9 % injection 2 mL  2 mL Intracatheter 2 times per day Remigio Cheung MD   2 mL at 03/18/24 2039    methylPREDNISolone (SOLU-Medrol) injection 52 mg  52 mg Intravenous Daily Conor Freeman MD   52 mg at 03/18/24 0825    diphenoxylate-atropine (LOMOTIL)  2.5-0.025 MG tablet 1 tablet  1 tablet Oral TID Petra Ogden APNP   1 tablet at 03/18/24 2038    famotidine (PEPCID) tablet 20 mg  20 mg Oral Daily Chuck Clarke MD   20 mg at 03/18/24 0826    [Held by provider] heparin (porcine) injection 5,000 Units  5,000 Units Subcutaneous BID Henna Miller MD   5,000 Units at 03/14/24 0723    AMIODarone (PACERONE) tablet 200 mg  200 mg Oral Daily Rigoberto Alvarenga MD   200 mg at 03/18/24 0825    ferrous sulfate (65 mg Fe per 325 mg) tablet 325 mg  325 mg Oral Daily with breakfast Rigoberto Alvarenga MD   325 mg at 03/18/24 0826    Magnesium Standard Replacement Protocol   Does not apply See Admin Instructions Rigoberto Alvarenga MD        Potassium Replacement (Levels 3.6 - 4)   Does not apply See Admin Instructions Rigoberto Alvarenga MD        Potassium Standard Replacement Protocol (Levels 3.5 and lower)   Does not apply See Admin Instructions Rigoberto Alvarenga MD        psyllium (METAMUCIL MULTIHEALTH FIBER) 58.12 % packet 1 packet  1 packet Oral Daily Rigoberto Alvarenga MD   1 packet at 03/18/24 0825    lidocaine (LIDOCARE) 4 % patch 1 patch  1 patch Transdermal Daily Rigoberto Alvarenga MD   1 patch at 03/18/24 0824     Current Facility-Administered Medications   Medication Dose Route Frequency Provider Last Rate Last Admin       Physical Exam:    General: Alert, no acute distress  HEENT: Head atraumatic, normocephalic.  Moist oral mucus membranes.  Sclera non-icteric.   Neck: Supple, no JVD.  Trachea midline.  Chest/Lungs: Normal effort.  Symmetrical expansion.  + perm cath  Abdomen: Soft, non tender, non distended.  No hepatosplenomegaly.  Neuro: No focal neurological deficit.  A&O x 3.   Skin: Warm, dry.  No rashes.   Extremities: No clubbing or cyanosis. No edema.  + dry, erythematous rash on extremities.    Laboratory Results:  Recent Labs   Lab 03/19/24  0711 03/18/24  1014 03/17/24  0717 03/16/24  0516 03/15/24  0557 03/14/24  0505   SODIUM  136 132* 133* 131* 126* 126*   POTASSIUM 4.6 5.2* 4.2 4.6 3.9 4.0   CHLORIDE 103 104 101 97 88* 91*   CO2 27 21 26 22 27 24   ANIONGAP 11 12 10 17 15 15   BUN 41* 57* 37* 40* 57* 57*   CREATININE 4.40* 6.00* 4.92* 6.29* 7.77* 6.97*   GLUCOSE 95 130* 116* 113* 110* 111*   CALCIUM 8.7 8.6 8.1* 8.0* 7.5* 7.3*   MG  --   --   --  1.8 1.7 1.4*         Recent Labs   Lab 03/19/24  0711 03/18/24  1014 03/17/24  0717   WBC 19.1* 20.3* 14.9*   HGB 8.5* 8.5* 8.0*   HCT 26.5* 25.5* 24.7*    410 376         No results found    Recent Labs   Lab 03/13/24  1803   C3 89*   C4 22.0         Urine Panel  Recent Labs   Lab 03/14/24  1429 03/13/24  1855   USPG  --  1.013   UPH  --  7.0   UKET  --  Negative   UPROT  --  300*   UGLU  --  100*   UBILI  --  Negative   UROB  --  0.2   UWBC  --  Negative   UNITR  --  Negative   URBC  --  Negative   ARCELIA 122  --          Imaging/Procedures:  Reviewed.          Impression, Plan & Recommendations:    Acute kidney injury S3   BRIANNE #1: ATN secondary to septic shock. Cr peaked 4.7 mg/dl and had improved to <1.0 mg/dl.  BRIANNE #2: Creatinine rising quickly since 3/9. Suspect prerenal with likely progression to ATN with superimposed AIN (has triad of fever, rash, eosinophilia). Preliminary renal biopsy results indicative of interstitial nephritis, await final renal biopsy results for definitve answer.   Remains anuric.   Work up:  UA with granular casts, RBCs, WBCs  UPCR 22 g/g  Urine eos negative though peripheral eosinophilia noted, were trending up. ID switched from Zosyn to levofloxacin 3/13  Blood cultures have been negative  CT abdomen negative for hydronephrosis/obstructive uropathy  Serologies: to evaluate for Acute GN   LEE, ANCA, Anti GBM negative  C3 minutely low at 89. C4 normal  UPEP/SPEP without monoclonal protein  Interventions:  Diuretics on hold  Renal biopsy performed on 3/15. Pending results  Empiric steroids for presumed AIN started  Avoid nephrotoxins  Discontinued quinonez  3/19  Started on HD 3/15.   Perm cath placed 3/15  Will continue HD MWF for now and will continue to monitor for signs of renal recovery     Metabolic acidois  Secondary to acute kidney injury, ostomy output  S/p bicarb IVF  Continue to improve with HD  Lactic acid normal    Hyponatremia  Suspect due to retained volume in the setting of anuria, renal losses  Added  FR    Hypomagnesemia: likely due to GI losses  Supplement PRN    Perforated viscus status post exploratory laparotomy  Now with concern for anastomotic leak  Surgery following - no surgical interventions at this time  High output ileostomy  S/p ex lap, hematoma evacuation 2/23  Started on antibiotics  BC NGTD    Patient seen in collaboration with Dr. Lua.  Taylor Magill, PA-C      I saw and examined the patient personally today. I agree with above history, physical examination, assessment and plan outlined by Taylor Magill, PA-C Fadi Hussein,MD  Transplant Nephrology      no

## 2024-04-11 ENCOUNTER — RESULT REVIEW (OUTPATIENT)
Age: 49
End: 2024-04-11

## 2024-05-02 ENCOUNTER — APPOINTMENT (OUTPATIENT)
Dept: ULTRASOUND IMAGING | Facility: IMAGING CENTER | Age: 49
End: 2024-05-02
Payer: COMMERCIAL

## 2024-05-02 ENCOUNTER — OUTPATIENT (OUTPATIENT)
Dept: OUTPATIENT SERVICES | Facility: HOSPITAL | Age: 49
LOS: 1 days | End: 2024-05-02
Payer: COMMERCIAL

## 2024-05-02 ENCOUNTER — RESULT REVIEW (OUTPATIENT)
Age: 49
End: 2024-05-02

## 2024-05-02 DIAGNOSIS — O03.9 COMPLETE OR UNSPECIFIED SPONTANEOUS ABORTION WITHOUT COMPLICATION: Chronic | ICD-10-CM

## 2024-05-02 DIAGNOSIS — Z98.890 OTHER SPECIFIED POSTPROCEDURAL STATES: Chronic | ICD-10-CM

## 2024-05-02 DIAGNOSIS — R92.8 OTHER ABNORMAL AND INCONCLUSIVE FINDINGS ON DIAGNOSTIC IMAGING OF BREAST: ICD-10-CM

## 2024-05-02 DIAGNOSIS — Z90.49 ACQUIRED ABSENCE OF OTHER SPECIFIED PARTS OF DIGESTIVE TRACT: Chronic | ICD-10-CM

## 2024-05-02 PROCEDURE — 76641 ULTRASOUND BREAST COMPLETE: CPT

## 2024-05-02 PROCEDURE — 76642 ULTRASOUND BREAST LIMITED: CPT | Mod: 26,LT

## 2024-05-02 PROCEDURE — 76642 ULTRASOUND BREAST LIMITED: CPT

## 2024-05-07 NOTE — ASU PREOP CHECKLIST - NSWEIGHTCALCTOOLDRUG_GEN_A_CORE
"Well-controlled, continue current medications and management.  Dietary sodium restriction.  Regular aerobic exercise program is recommended to help achieve and maintain normal body weight, fitness and improve lipid balance. .  Dietary changes: Increase soluble fiber  Plant sterols 2grams per day (e.g. Benecol)  Reduce saturated fat, \"trans\" monounsaturated fatty acids, and cholesterol  "  used

## 2024-05-08 ENCOUNTER — NON-APPOINTMENT (OUTPATIENT)
Age: 49
End: 2024-05-08

## 2024-05-15 ENCOUNTER — OUTPATIENT (OUTPATIENT)
Dept: OUTPATIENT SERVICES | Facility: HOSPITAL | Age: 49
LOS: 1 days | Discharge: HOME | End: 2024-05-15

## 2024-05-15 DIAGNOSIS — Z98.890 OTHER SPECIFIED POSTPROCEDURAL STATES: Chronic | ICD-10-CM

## 2024-05-15 DIAGNOSIS — R92.8 OTHER ABNORMAL AND INCONCLUSIVE FINDINGS ON DIAGNOSTIC IMAGING OF BREAST: ICD-10-CM

## 2024-05-15 DIAGNOSIS — O03.9 COMPLETE OR UNSPECIFIED SPONTANEOUS ABORTION WITHOUT COMPLICATION: Chronic | ICD-10-CM

## 2024-05-15 DIAGNOSIS — Z90.49 ACQUIRED ABSENCE OF OTHER SPECIFIED PARTS OF DIGESTIVE TRACT: Chronic | ICD-10-CM

## 2024-05-21 ENCOUNTER — APPOINTMENT (OUTPATIENT)
Dept: HEMATOLOGY ONCOLOGY | Facility: CLINIC | Age: 49
End: 2024-05-21

## 2024-05-21 DIAGNOSIS — D50.8 OTHER IRON DEFICIENCY ANEMIAS: ICD-10-CM

## 2024-06-03 PROBLEM — C50.919 BREAST CANCER: Status: ACTIVE | Noted: 2022-10-13

## 2024-06-03 NOTE — CONSULT LETTER
[Dear  ___] : Dear  [unfilled], [Courtesy Letter:] : I had the pleasure of seeing your patient, [unfilled], in my office today. [Please see my note below.] : Please see my note below. [Consult Closing:] : Thank you very much for allowing me to participate in the care of this patient.  If you have any questions, please do not hesitate to contact me. [Sincerely,] : Sincerely, [FreeTextEntry3] : Suzan Troncoso MD\par  Breast Surgeon\par  Division of Surgical Oncology\par  Department of Surgery\par  [address] [place] [address]\[place] [address] \par  Tel: [phone]\par  Fax: [phone]\par  Email: [email or web address]

## 2024-06-03 NOTE — HISTORY OF PRESENT ILLNESS
[FreeTextEntry1] : The patient is a 49 year F referred by Dr. Sherie Mcgregor for consultation regarding L IDC w/ tubular features s/p L lumpectomy and L SLNB on 11/10/2022, here for follow up.   Patient previously had B/L USG-CNB in 2021 revealing R 9:00 PASH and L 10:00 fibroadenomatoid and PASH, concordant. Radiology recommended f/u imaging in 6 months. She denies any breast symptoms prior to her imaging this year.  Most recent imagin2022 B/L DM (NW) TC 12.% revealed heterogeneously dense breasts  - L UOQ architectural distortion -> Stereo - B/L previously bx masses are not significantly changed and contain bx markers - Nodular parenchymal pattern  2022 B/L US - R 9:00 N14 2.1 x 1.5 x 3 cm heterogenous mass, stable from 2021 (previously bx PASH). W/in this mass there is a 0.9 x 0.7 x 1 cm cyst. - R UOQ multiple cysts and cyst clusters ranging in size measuring up to 3 mm - L 10:00 N8 1.8 x 1.2 x 2.3 cm hypoechoic mass (previously bx yielding fibroadenomatoid nodule and PASH) unchanged from 2021. - BR 4B  10/3/2022 L Stereo  - L UOQ (tophat): Tubular carcinoma, G1, 0.3 cm. Calcs and necrosis absent. DCIS, cribriform, low grade atypia present w/ microcalcs. Smooth muscle myosin heavy chain and P63 show absence of myoepithelial cells in the infiltrative glands, support histologic dx of tubular carcinoma. IDP w/ microcalcs.   - %, [place] 90% strong, HER2 2+ Equivocal, CISH neg *Corrected: IDC w/ tubular features, G1, 0.3 cm. ADH w/ calcs, cannot exclude DCIS; FEA; IDP.   She reports anemia from fibroids. She is scheduled for a myomectomy in Dec 2022. Patient's past surgical history include D&C, hysteroscopy w/ poly removal, B/L breast biopsies (2021). She takes no medications. Patient has no family history of breast cancer, or other cancers.  10/19/2022 [facility] GT (9 gene panel): negative  10/24/2022 MRI (NW) - R LOQ bx marker w/in circumscribed enhancing mass (previously bx PASH) - R upper central 1.7 cm in AP dimension circumscribed T2 hyperintense enhancing mass demonstrating progressive kinetics -> MR BX if it alters treatment plan - L 1.8 x 2 x 2.6 cm clumped NME-> wide excision  - L UIQ 2.4 cm circumscribed mass containing bx marker (yielding PASH) - BR4A  11/10/2022 L lumpectomy and L SLNB - Tubular carcinoma, [place] grade 1, 1.8 cm. DCIS, nuclear grade 2, cribriform, flat and micropapillary types with calcifications. The invasive tumor is admixed with the invasive component and constitutes less than 5% of the total tumor volume. No LVI - Margins negative - 8 SLN negative (0/8) -  %, [place] 90% strong, HER2 2+ Equivocal, CISH neg - [id number], AJCC stage IA  2022 (postop):  The patient reports that she is feeling well. Notes some numbness and feeling "funny" on the back of her left arm. Denies any arm swelling. Denies any breast pain.   2023: Patient completed radiation therapy on 2023. Has noticed burning pain in the breast associated with skin darkening, and complaint of "pus drainage." Applying creams. No fevers/chills. Planned for hysterectomy 3/20/23. Will be seeing Dr Jewell on 3/9 for followup for endocrine therapy.  2023 Breast MRI (NW) -R LOQ previously biopsied lesion (PASH) 3 cm, stable -R upper central previously described enhancing mass is less conspicuous --> f/u MRI in 6 months -L postop and post RT changes -L UIQ previously biopsed lesion (PASH) 2.4 cm, stable -BR3  10/19/2023 Breast MRI (NW) -R LOQ previously biopsied lesion (PASH) 3 cm, stable -R upper central previously described enhancing mass is not well defined c/w background parenchymal enhancement -L UIQ previously biopsed lesion (PASH) 2.4 cm, stable -BR2--> MRI in one year  2023 B/L DM (NW) heterogeneously dense breasts   - L UOQ posterior post-surgical scar  - L upper posterior breast ribbon clip from benign bx, stable  - R outer posterior breast ribbon clip stable  2023 B/L US (NW)  - R 9:00 N14 2.9 x 1.3 x 2.0 cm ovoid mass - PASH- stable  - R 9:00 N14 4 x 2 x 3 mm cyst, smaller  - L 2:00 N15 scar   - L 10:00 N8 1.8 x 0.7 x 1.3 cm - PASH- stable  - L RA 1.1 x 0.5 cm x 0.9 cm hypoechoic nodule vs inverted nipple -> 6 mo US  - BR3  11/3/2023: Patient is s/p FITZ/BSO 2023.  She started Anastrazole on 2023, but stopped after 2 weeks due to severe joint pain. Switched to exemestane and had significant pains as well- only took for 2-3 weeks, stopped recently. Left breast hardness still since surgery. No lumps, no skin change, no nipple discharge.  2024 L US (NW) -L inverted nipple unchanged -BR1  Interval History:

## 2024-06-03 NOTE — ASSESSMENT
[FreeTextEntry1] : The patient is a 49 year F referred by Dr. Sherie Mcgregor for consultation regarding L IDC w/ tubular features %, ID 90% strong, HER2 Equivocal, CISH neg, s/p RT, not currently on endocrine tx, here for follow up.  11/10/2022 L lumpectomy and L SLNB - Tubular carcinoma, [place] grade 1, 1.8 cm. DCIS, nuclear grade 2, cribriform, flat and micropapillary types with calcifications. The invasive tumor is admixed with the invasive component and constitutes less than 5% of the total tumor volume. No LVI - Margins negative - 8 SLN negative (0/8) - %, ID 90% strong, HER2 2+ Equivocal, CISH neg - [id number], AJCC stage IA - ODX13  11/22/2023 Exam shows a well-healing incision, no evidence of infection, hematoma or seroma.  Most recent breast MRI 10/19/2023- benign findings (BR2)  Recent L US negative, BR1  Exam today shows ______  Plan: - B/L SM/US 11/2024 - F/u med onc, needs endocrine therapy - RTO 6 months

## 2024-06-07 ENCOUNTER — APPOINTMENT (OUTPATIENT)
Dept: SURGICAL ONCOLOGY | Facility: CLINIC | Age: 49
End: 2024-06-07

## 2024-06-07 DIAGNOSIS — C50.919 MALIGNANT NEOPLASM OF UNSPECIFIED SITE OF UNSPECIFIED FEMALE BREAST: ICD-10-CM

## 2024-06-20 ENCOUNTER — APPOINTMENT (OUTPATIENT)
Dept: HEMATOLOGY ONCOLOGY | Facility: CLINIC | Age: 49
End: 2024-06-20

## 2024-07-17 ENCOUNTER — NON-APPOINTMENT (OUTPATIENT)
Age: 49
End: 2024-07-17

## 2024-07-29 ENCOUNTER — NON-APPOINTMENT (OUTPATIENT)
Age: 49
End: 2024-07-29

## 2024-08-15 ENCOUNTER — OUTPATIENT (OUTPATIENT)
Dept: OUTPATIENT SERVICES | Facility: HOSPITAL | Age: 49
LOS: 1 days | Discharge: HOME | End: 2024-08-15

## 2024-08-15 DIAGNOSIS — Z98.890 OTHER SPECIFIED POSTPROCEDURAL STATES: Chronic | ICD-10-CM

## 2024-08-15 DIAGNOSIS — R92.8 OTHER ABNORMAL AND INCONCLUSIVE FINDINGS ON DIAGNOSTIC IMAGING OF BREAST: ICD-10-CM

## 2024-08-15 DIAGNOSIS — Z90.49 ACQUIRED ABSENCE OF OTHER SPECIFIED PARTS OF DIGESTIVE TRACT: Chronic | ICD-10-CM

## 2024-08-15 DIAGNOSIS — O03.9 COMPLETE OR UNSPECIFIED SPONTANEOUS ABORTION WITHOUT COMPLICATION: Chronic | ICD-10-CM

## 2024-08-23 ENCOUNTER — RESULT REVIEW (OUTPATIENT)
Age: 49
End: 2024-08-23

## 2024-08-23 ENCOUNTER — APPOINTMENT (OUTPATIENT)
Dept: HEMATOLOGY ONCOLOGY | Facility: CLINIC | Age: 49
End: 2024-08-23
Payer: COMMERCIAL

## 2024-08-23 VITALS
RESPIRATION RATE: 16 BRPM | WEIGHT: 207.23 LBS | BODY MASS INDEX: 37.9 KG/M2 | OXYGEN SATURATION: 97 % | SYSTOLIC BLOOD PRESSURE: 130 MMHG | DIASTOLIC BLOOD PRESSURE: 86 MMHG | TEMPERATURE: 97.3 F | HEART RATE: 68 BPM

## 2024-08-23 DIAGNOSIS — M25.50 PAIN IN UNSPECIFIED JOINT: ICD-10-CM

## 2024-08-23 DIAGNOSIS — Z79.899 OTHER LONG TERM (CURRENT) DRUG THERAPY: ICD-10-CM

## 2024-08-23 DIAGNOSIS — C50.919 MALIGNANT NEOPLASM OF UNSPECIFIED SITE OF UNSPECIFIED FEMALE BREAST: ICD-10-CM

## 2024-08-23 DIAGNOSIS — T45.1X5A PAIN IN UNSPECIFIED JOINT: ICD-10-CM

## 2024-08-23 LAB
ALBUMIN SERPL ELPH-MCNC: 4.2 G/DL
ALP BLD-CCNC: 86 U/L
ALT SERPL-CCNC: 13 U/L
ANION GAP SERPL CALC-SCNC: 10 MMOL/L
AST SERPL-CCNC: 17 U/L
BASOPHILS # BLD AUTO: 0.04 K/UL — SIGNIFICANT CHANGE UP (ref 0–0.2)
BASOPHILS NFR BLD AUTO: 0.7 % — SIGNIFICANT CHANGE UP (ref 0–2)
BILIRUB SERPL-MCNC: 0.6 MG/DL
BUN SERPL-MCNC: 10 MG/DL
CALCIUM SERPL-MCNC: 9.9 MG/DL
CHLORIDE SERPL-SCNC: 106 MMOL/L
CO2 SERPL-SCNC: 27 MMOL/L
CREAT SERPL-MCNC: 0.82 MG/DL
EGFR: 89 ML/MIN/1.73M2
EOSINOPHIL # BLD AUTO: 0.13 K/UL — SIGNIFICANT CHANGE UP (ref 0–0.5)
EOSINOPHIL NFR BLD AUTO: 2.2 % — SIGNIFICANT CHANGE UP (ref 0–6)
GLUCOSE SERPL-MCNC: 96 MG/DL
HCT VFR BLD CALC: 38.5 % — SIGNIFICANT CHANGE UP (ref 34.5–45)
HGB BLD-MCNC: 11.9 G/DL — SIGNIFICANT CHANGE UP (ref 11.5–15.5)
IMM GRANULOCYTES NFR BLD AUTO: 0.3 % — SIGNIFICANT CHANGE UP (ref 0–0.9)
LYMPHOCYTES # BLD AUTO: 2.56 K/UL — SIGNIFICANT CHANGE UP (ref 1–3.3)
LYMPHOCYTES # BLD AUTO: 43.5 % — SIGNIFICANT CHANGE UP (ref 13–44)
MCHC RBC-ENTMCNC: 26.7 PG — LOW (ref 27–34)
MCHC RBC-ENTMCNC: 30.9 G/DL — LOW (ref 32–36)
MCV RBC AUTO: 86.3 FL — SIGNIFICANT CHANGE UP (ref 80–100)
MONOCYTES # BLD AUTO: 0.3 K/UL — SIGNIFICANT CHANGE UP (ref 0–0.9)
MONOCYTES NFR BLD AUTO: 5.1 % — SIGNIFICANT CHANGE UP (ref 2–14)
NEUTROPHILS # BLD AUTO: 2.83 K/UL — SIGNIFICANT CHANGE UP (ref 1.8–7.4)
NEUTROPHILS NFR BLD AUTO: 48.2 % — SIGNIFICANT CHANGE UP (ref 43–77)
NRBC # BLD: 0 /100 WBCS — SIGNIFICANT CHANGE UP (ref 0–0)
NRBC BLD-RTO: 0 /100 WBCS — SIGNIFICANT CHANGE UP (ref 0–0)
PLATELET # BLD AUTO: 290 K/UL — SIGNIFICANT CHANGE UP (ref 150–400)
POTASSIUM SERPL-SCNC: 4.9 MMOL/L
PROT SERPL-MCNC: 7.7 G/DL
RBC # BLD: 4.46 M/UL — SIGNIFICANT CHANGE UP (ref 3.8–5.2)
RBC # FLD: 14 % — SIGNIFICANT CHANGE UP (ref 10.3–14.5)
SODIUM SERPL-SCNC: 143 MMOL/L
WBC # BLD: 5.88 K/UL — SIGNIFICANT CHANGE UP (ref 3.8–10.5)
WBC # FLD AUTO: 5.88 K/UL — SIGNIFICANT CHANGE UP (ref 3.8–10.5)

## 2024-08-23 PROCEDURE — 99214 OFFICE O/P EST MOD 30 MIN: CPT

## 2024-08-23 PROCEDURE — G2211 COMPLEX E/M VISIT ADD ON: CPT | Mod: NC

## 2024-08-23 RX ORDER — LETROZOLE TABLETS 2.5 MG/1
2.5 TABLET, FILM COATED ORAL
Qty: 90 | Refills: 1 | Status: ACTIVE | COMMUNITY
Start: 2024-08-23 | End: 1900-01-01

## 2024-08-23 NOTE — ASSESSMENT
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; also with history of PASH of the right breast; s/p lumpectomy in 11/2022; Oncotype which was low risk with recurrence score of 13; advised patient that there is no role for adjuvant chemotherapy in her case given low risk disease; completed adjuvant RT 2/17/2023;  advised patient that after completing RT, would recommend initiation of adjuvant endocrine therapy with either tamoxifen 20mg daily for 7-10 years or ovarian suppression and use of aromatase inhibitor for 7-10 years; She opted to undergo hysterectomy and BSO on 4/7/2023 - she has recovered well  - Started AI 6/1/2023 and is currently on letrozole; she continues to have arthralgias which are interfering with her ADL's and QOL.   - annual mammo/sono due 11/2024 - Breast MRI due  4/2025 - f/up with Dr. Troncoso and Dr. Lainez as recommended - bw ordered today   # AI associated arthalgias - discussed possible supportive supplements i.e turmeric, claritin and glucosamine - acupuncture and exercise may also be of benefit - we also discussed the option of changing to Tamoxifen - I would discuss this further with Dr. Lauren - but there is no family history/personal history of blood clots; and patient is s/p hysterectomy.  We discussed that AI's have demonstrated slightly improved efficacy compared to Tamoxifen but it is important to find a medication that Elishebeh can be maintained on long term.  - At this time, elishebeh will try some of the supportive options with letrozole and will notify the office if she needs anything further.   PMD at least annually with lipid checks/bloodwork, gyn at least annually and PAP test screening per their recommendations, colon cancer screening/colonoscopy at least every 10 years as recommended by PMD/GI , dermatology for annual skin checks  ophthalmology for annual eye exams - also due for screening colonoscopy   Patient had an opportunity to have her questions asked and answered to her satisfaction. f/u in 3 months

## 2024-08-23 NOTE — HISTORY OF PRESENT ILLNESS
[Disease: _____________________] : Disease: [unfilled] [T: ___] : T[unfilled] [N: ___] : N[unfilled] [M: ___] : M[unfilled] [AJCC Stage: ____] : AJCC Stage: [unfilled] [100: Normal, no complaints, no evidence of disease.] : 100: Normal, no complaints, no evidence of disease. [de-identified] : Patient who in 2021 was noted onto have Right breast PASH at 9:00 and Left Breast fibroadenomatoid and PASH at 10:00, concordant, undergoing f/u imaging in 6 months. She denies any breast symptoms prior to her imaging this year.\par  \par  Most recent imaging [id number]:\par  2022 B/L Diagnostic mammo (NW) TC 12.% revealed heterogeneously dense breasts \par  - L UOQ architectural distortion -> Stereotactic biopsy recommended\par  - B/L previously bx masses are not significantly changed and contain bx markers\par  - Nodular parenchymal pattern\par  \par  2022 B/L US\par  - R 9:00 N14 2.1 x 1.5 x 3 cm heterogenous mass, stable from 2021 (previously bx PASH). 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 Calcs and necrosis absent. DCIS, cribriform, low grade atypia present w/ microcalcs. Smooth muscle myosin heavy chain and P63 show absence of myoepithelial cells in the infiltrative glands, support histologic dx of tubular carcinoma. IDP w/ microcalcs. \par  - %, [place] 90% strong, HER2 2+ Equivocal, CISH negative\par  \par  Preoperative MRI on 10/24/22 showed biopsy proven malignancy in Upper outer quadrant of the left breast (2.6cm); recommended wide excsision. T2 hyperintese cicumscribed mass in upper central right breast with similar appearance to other biopsy proven PASH was also noted.\par  \par  Genetic Testing with [facility] - negative\par  \par  On 11/10/22 she underwent Left breast lumpectomy and SLNB; Final Path Tubular carcinoma, [place] grade 4/9, 1.8cm in largest dimension; additionally DCIS with flat and micropappillary features was noted; all margins negative, 2 SLN removed and negative; Oncotype recurrence score 13\par  \par  \par  Patient has also been found to have iron deficiency anemia as per her primary care physican and has started on IV iron infusions as of last week. Is suspected to have iron deficiency due to heavy menstrual periods as result of fibroids; has not had GI evaluation\par  \par  She was seen by Radiation Oncology and completed adjuvant RT 2023.\par  Delayed initiation of endocrine therapy as she underwent hysterectomy with BSO on 23\par  \par  \par  GYN history Menarche at 13, having heavy menstrual periods with LMP 12/10/22, , 1 miscarriage; 1st full term pregnancy at age 24; did not breastfeed  [de-identified] : %, PR90%, [id number] equivocal by IHC, CISH negative [FreeTextEntry1] : FITZ-BSO - 4/7/23\par  Anastrazole - 4/20/23 [de-identified] : 8/23/2024 Patient returns today to rule out progression or recurrence of breast cancer and to assess treatment toxicity. did not tolerate anastrozole or exemestane; now on letrozole underwent TLH-BSO on 4/7/2023;; started AI on 6/1/23 kiana has ongoing arthralgias that significantly limit her ability to go up stairs and can't even drive at times. KIANA  denies any breast masses, breast tenderness, skin changes or nipple discharge. She denies any SOB, CP, abdominal pain, bone pain, headache, or unexplained weight loss;  KIANA  has not experienced any significant hotflashes,vaginal dryness, vaginal bleeding, hair loss, muscle cramps.  Breast Imaging: MRI 10/19/2023 Mammo/Sono 11/2/2023  -- f/up ultrasound L. recommended 6 months - Birads 1.0 - Negative - next due 11/2024 BMD - 8/10/2023 - normal

## 2024-08-23 NOTE — END OF VISIT
[Time Spent: ___ minutes] : I have spent [unfilled] minutes of time on the encounter which excludes teaching and separately reported services. [FreeTextEntry3] : \par  \par

## 2024-08-23 NOTE — REVIEW OF SYSTEMS
[Fatigue] : fatigue [Negative] : Allergic/Immunologic [Dysmenorrhea/Abn Vaginal Bleeding] : no dysmenorrhea/abnormal vaginal bleeding [de-identified] : s/p FITZ/BSO; denies hot flushes

## 2024-08-23 NOTE — PHYSICAL EXAM
[Normal] : affect appropriate [de-identified] : overweight - BMI 38 [de-identified] : restricted range of motion and discomfort of left upper extremity with extension [de-identified] : left breast with well healed incision; noted intradermal edema and post radiation changes; no palpable masses in either breast

## 2024-08-28 PROBLEM — C50.912 MALIGNANT NEOPLASM OF LEFT FEMALE BREAST, UNSPECIFIED ESTROGEN RECEPTOR STATUS, UNSPECIFIED SITE OF BREAST: Status: ACTIVE | Noted: 2024-08-28

## 2024-08-28 NOTE — ASSESSMENT
[FreeTextEntry1] : The patient is a 49 year F referred by Dr. 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 The invasive tumor is admixed with the invasive component and constitutes less than 5% of the total tumor volume. No LVI - Margins negative - 8 SLN negative (0/8) - %, GA 90% strong, HER2 2+ Equivocal, CISH neg - [id number], AJCC stage IA - [id number]  11/22/2023 Exam shows a well-healing incision, no evidence of infection, hematoma or seroma.  Most recent breast MRI 10/19/2023- benign findings (BR2)  Recent L US negative, BR1  Exam today shows ______  Plan: - B/L SM/US 11/2024 - F/u med onc, continue endocrine therapy - RTO 6 months

## 2024-08-28 NOTE — CONSULT LETTER
[FreeTextEntry3] : Suzan Troncoso MD\par  Breast Surgeon\par  Division of Surgical Oncology\par  Department of Surgery\par  [address] [place] [address]\[place] [address] \par  Tel: [phone]\par  Fax: [phone]\par  Email: [email or web address]

## 2024-08-28 NOTE — HISTORY OF PRESENT ILLNESS
[FreeTextEntry1] : The patient is a 49 year F referred by Dr. Sherie Mcgregor for consultation regarding L IDC w/ tubular features s/p L lumpectomy and L SLNB on 11/10/2022, here for follow up.   Patient previously had B/L USG-CNB in 2021 revealing R 9:00 PASH and L 10:00 fibroadenomatoid and PASH, concordant. Radiology recommended f/u imaging in 6 months. She denies any breast symptoms prior to her imaging this year.  Most recent imagin2022 B/L DM (NW) TC 12.% revealed heterogeneously dense breasts  - L UOQ architectural distortion -> Stereo - B/L previously bx masses are not significantly changed and contain bx markers - Nodular parenchymal pattern  2022 B/L US - R 9:00 N14 2.1 x 1.5 x 3 cm heterogenous mass, stable from 2021 (previously bx PASH). W/in this mass there is a 0.9 x 0.7 x 1 cm cyst. - R UOQ multiple cysts and cyst clusters ranging in size measuring up to 3 mm - L 10:00 N8 1.8 x 1.2 x 2.3 cm hypoechoic mass (previously bx yielding fibroadenomatoid nodule and PASH) unchanged from 2021. - BR 4B  10/3/2022 L Stereo  - L UOQ (tophat): Tubular carcinoma, G1, 0.3 cm. Calcs and necrosis absent. DCIS, cribriform, low grade atypia present w/ microcalcs. Smooth muscle myosin heavy chain and P63 show absence of myoepithelial cells in the infiltrative glands, support histologic dx of tubular carcinoma. IDP w/ microcalcs.   - %, [place] 90% strong, HER2 2+ Equivocal, CISH neg *Corrected: IDC w/ tubular features, G1, 0.3 cm. ADH w/ calcs, cannot exclude DCIS; FEA; IDP.   She reports anemia from fibroids. She is scheduled for a myomectomy in Dec 2022. Patient's past surgical history include D&C, hysteroscopy w/ poly removal, B/L breast biopsies (2021). She takes no medications. Patient has no family history of breast cancer, or other cancers.  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 The invasive tumor is admixed with the invasive component and constitutes less than 5% of the total tumor volume. No LVI - Margins negative - 8 SLN negative (0/8) -  %, [place] 90% strong, HER2 2+ Equivocal, CISH neg - [id number], AJCC stage IA  2022 (postop):  The patient reports that she is feeling well. Notes some numbness and feeling "funny" on the back of her left arm. Denies any arm swelling. Denies any breast pain.   2023: Patient completed radiation therapy on 2023. Has noticed burning pain in the breast associated with skin darkening, and complaint of "pus drainage." Applying creams. No fevers/chills. Planned for hysterectomy 3/20/23. Will be seeing Dr Jewell on 3/9 for followup for endocrine therapy.  2023 Breast MRI (NW) -R LOQ previously biopsied lesion (PASH) 3 cm, stable -R upper central previously described enhancing mass is less conspicuous --> f/u MRI in 6 months -L postop and post RT changes -L UIQ previously biopsed lesion (PASH) 2.4 cm, stable -BR3  10/19/2023 Breast MRI (NW) -R LOQ previously biopsied lesion (PASH) 3 cm, stable -R upper central previously described enhancing mass is not well defined c/w background parenchymal enhancement -L UIQ previously biopsed lesion (PASH) 2.4 cm, stable -BR2--> MRI in one year  2023 B/L DM (NW) heterogeneously dense breasts   - L UOQ posterior post-surgical scar  - L upper posterior breast ribbon clip from benign bx, stable  - R outer posterior breast ribbon clip stable  2023 B/L US (NW)  - R 9:00 N14 2.9 x 1.3 x 2.0 cm ovoid mass - PASH- stable  - R 9:00 N14 4 x 2 x 3 mm cyst, smaller  - L 2:00 N15 scar   - L 10:00 N8 1.8 x 0.7 x 1.3 cm - PASH- stable  - L RA 1.1 x 0.5 cm x 0.9 cm hypoechoic nodule vs inverted nipple -> 6 mo US  - BR3  11/3/2023: Patient is s/p FITZ/BSO 2023.  She started Anastrazole on 2023, but stopped after 2 weeks due to severe joint pain. Switched to exemestane and had significant pains as well- only took for 2-3 weeks, stopped recently. Left breast hardness still since surgery. No lumps, no skin change, no nipple discharge.  2024 L US (NW) -L inverted nipple unchanged -BR1  Interval History:  Patient followed up with med onc and was prescribed Letrozole.

## 2024-09-03 ENCOUNTER — APPOINTMENT (OUTPATIENT)
Dept: SURGICAL ONCOLOGY | Facility: CLINIC | Age: 49
End: 2024-09-03

## 2024-09-03 DIAGNOSIS — C50.912 MALIGNANT NEOPLASM OF UNSPECIFIED SITE OF LEFT FEMALE BREAST: ICD-10-CM

## 2024-10-15 ENCOUNTER — NON-APPOINTMENT (OUTPATIENT)
Age: 49
End: 2024-10-15

## 2024-11-04 ENCOUNTER — RESULT REVIEW (OUTPATIENT)
Age: 49
End: 2024-11-04

## 2024-11-04 ENCOUNTER — APPOINTMENT (OUTPATIENT)
Dept: MAMMOGRAPHY | Facility: IMAGING CENTER | Age: 49
End: 2024-11-04
Payer: COMMERCIAL

## 2024-11-04 ENCOUNTER — OUTPATIENT (OUTPATIENT)
Dept: OUTPATIENT SERVICES | Facility: HOSPITAL | Age: 49
LOS: 1 days | End: 2024-11-04
Payer: COMMERCIAL

## 2024-11-04 DIAGNOSIS — Z98.890 OTHER SPECIFIED POSTPROCEDURAL STATES: Chronic | ICD-10-CM

## 2024-11-04 DIAGNOSIS — O03.9 COMPLETE OR UNSPECIFIED SPONTANEOUS ABORTION WITHOUT COMPLICATION: Chronic | ICD-10-CM

## 2024-11-04 DIAGNOSIS — Z90.49 ACQUIRED ABSENCE OF OTHER SPECIFIED PARTS OF DIGESTIVE TRACT: Chronic | ICD-10-CM

## 2024-11-04 DIAGNOSIS — C50.919 MALIGNANT NEOPLASM OF UNSPECIFIED SITE OF UNSPECIFIED FEMALE BREAST: ICD-10-CM

## 2024-11-04 PROCEDURE — 77067 SCR MAMMO BI INCL CAD: CPT

## 2024-11-04 PROCEDURE — 77066 DX MAMMO INCL CAD BI: CPT | Mod: 26

## 2024-11-04 PROCEDURE — 77066 DX MAMMO INCL CAD BI: CPT

## 2024-11-04 PROCEDURE — 77063 BREAST TOMOSYNTHESIS BI: CPT

## 2024-11-13 NOTE — ED ADULT TRIAGE NOTE - SOURCE OF INFORMATION
Patient heard that there is a new podiatrist in [facility] and would like a referral to see him. Please submit referral and call patient back if needed, thank you.    Patient

## 2024-11-21 ENCOUNTER — NON-APPOINTMENT (OUTPATIENT)
Age: 49
End: 2024-11-21

## 2024-12-04 ENCOUNTER — APPOINTMENT (OUTPATIENT)
Dept: SURGICAL ONCOLOGY | Facility: CLINIC | Age: 49
End: 2024-12-04

## 2024-12-04 DIAGNOSIS — C50.912 MALIGNANT NEOPLASM OF UNSPECIFIED SITE OF LEFT FEMALE BREAST: ICD-10-CM

## 2025-03-05 ENCOUNTER — OUTPATIENT (OUTPATIENT)
Dept: OUTPATIENT SERVICES | Facility: HOSPITAL | Age: 50
LOS: 1 days | Discharge: HOME | End: 2025-03-05

## 2025-03-05 DIAGNOSIS — Z98.890 OTHER SPECIFIED POSTPROCEDURAL STATES: Chronic | ICD-10-CM

## 2025-03-05 DIAGNOSIS — Z90.49 ACQUIRED ABSENCE OF OTHER SPECIFIED PARTS OF DIGESTIVE TRACT: Chronic | ICD-10-CM

## 2025-03-05 DIAGNOSIS — O03.9 COMPLETE OR UNSPECIFIED SPONTANEOUS ABORTION WITHOUT COMPLICATION: Chronic | ICD-10-CM

## 2025-03-05 DIAGNOSIS — R92.8 OTHER ABNORMAL AND INCONCLUSIVE FINDINGS ON DIAGNOSTIC IMAGING OF BREAST: ICD-10-CM

## 2025-03-07 ENCOUNTER — APPOINTMENT (OUTPATIENT)
Dept: HEMATOLOGY ONCOLOGY | Facility: CLINIC | Age: 50
End: 2025-03-07
Payer: COMMERCIAL

## 2025-03-07 ENCOUNTER — RESULT REVIEW (OUTPATIENT)
Age: 50
End: 2025-03-07

## 2025-03-07 ENCOUNTER — NON-APPOINTMENT (OUTPATIENT)
Age: 50
End: 2025-03-07

## 2025-03-07 VITALS
DIASTOLIC BLOOD PRESSURE: 94 MMHG | HEART RATE: 77 BPM | TEMPERATURE: 97.3 F | SYSTOLIC BLOOD PRESSURE: 145 MMHG | WEIGHT: 200.18 LBS | RESPIRATION RATE: 16 BRPM | OXYGEN SATURATION: 99 % | BODY MASS INDEX: 36.61 KG/M2

## 2025-03-07 DIAGNOSIS — C50.912 MALIGNANT NEOPLASM OF UNSPECIFIED SITE OF LEFT FEMALE BREAST: ICD-10-CM

## 2025-03-07 LAB
BASOPHILS # BLD AUTO: 0.04 K/UL — SIGNIFICANT CHANGE UP (ref 0–0.2)
BASOPHILS NFR BLD AUTO: 0.5 % — SIGNIFICANT CHANGE UP (ref 0–2)
EOSINOPHIL # BLD AUTO: 0.18 K/UL — SIGNIFICANT CHANGE UP (ref 0–0.5)
EOSINOPHIL NFR BLD AUTO: 2.4 % — SIGNIFICANT CHANGE UP (ref 0–6)
HCT VFR BLD CALC: 36.9 % — SIGNIFICANT CHANGE UP (ref 34.5–45)
HGB BLD-MCNC: 11.5 G/DL — SIGNIFICANT CHANGE UP (ref 11.5–15.5)
IMM GRANULOCYTES NFR BLD AUTO: 0.3 % — SIGNIFICANT CHANGE UP (ref 0–0.9)
LYMPHOCYTES # BLD AUTO: 3 K/UL — SIGNIFICANT CHANGE UP (ref 1–3.3)
LYMPHOCYTES # BLD AUTO: 40.4 % — SIGNIFICANT CHANGE UP (ref 13–44)
MCHC RBC-ENTMCNC: 26.6 PG — LOW (ref 27–34)
MCHC RBC-ENTMCNC: 31.2 G/DL — LOW (ref 32–36)
MCV RBC AUTO: 85.2 FL — SIGNIFICANT CHANGE UP (ref 80–100)
MONOCYTES # BLD AUTO: 0.37 K/UL — SIGNIFICANT CHANGE UP (ref 0–0.9)
MONOCYTES NFR BLD AUTO: 5 % — SIGNIFICANT CHANGE UP (ref 2–14)
NEUTROPHILS # BLD AUTO: 3.81 K/UL — SIGNIFICANT CHANGE UP (ref 1.8–7.4)
NEUTROPHILS NFR BLD AUTO: 51.4 % — SIGNIFICANT CHANGE UP (ref 43–77)
NRBC BLD AUTO-RTO: 0 /100 WBCS — SIGNIFICANT CHANGE UP (ref 0–0)
PLATELET # BLD AUTO: 309 K/UL — SIGNIFICANT CHANGE UP (ref 150–400)
RBC # BLD: 4.33 M/UL — SIGNIFICANT CHANGE UP (ref 3.8–5.2)
RBC # FLD: 14.5 % — SIGNIFICANT CHANGE UP (ref 10.3–14.5)
WBC # BLD: 7.42 K/UL — SIGNIFICANT CHANGE UP (ref 3.8–10.5)
WBC # FLD AUTO: 7.42 K/UL — SIGNIFICANT CHANGE UP (ref 3.8–10.5)

## 2025-03-07 PROCEDURE — G2211 COMPLEX E/M VISIT ADD ON: CPT | Mod: NC

## 2025-03-07 PROCEDURE — 99214 OFFICE O/P EST MOD 30 MIN: CPT

## 2025-05-06 NOTE — PHYSICAL EXAM
Goal Outcome Evaluation:      Plan of Care Reviewed With: patient    PRIMARY Concern: Pt admitted for L hand cellulitis. POD #0 L thumb joint aspiration   SAFETY RISK Concerns (fall risk, behaviors, etc.): fall risk       Aggression Tool Color: green   Isolation/Type: none   Tests/Procedures for NEXT shift: labs in am , cultures pending   Consults? (Pending/following, signed-off?) ortho following   Where is patient from? (Home, TCU, etc.): [facility]  Other Important info for NEXT shift: L thumb aspiration done. Bandaid on.. CMS intact. Not able to bend her thumb yet due to pain   Anticipated DC date & active delays: tomorrow   _____________________________________________________________________________  SUMMARY NOTE:   Orientation/Cognitive: A/o X4. Forgetful. [place]. Pocket talker offered   Observation Goals (Met/ Not Met): inpt status   Mobility Level/Assist Equipment: up IND. Walker in room   Antibiotics & Plan (IV/po, length of tx left): IV Ancef q8 hrs   Pain Management: denies   Complete Pain Reassessment: Y/N yes  Due next: next shift   Tele/VS/O2: VSS RA   ABNL Lab/BG: CRP trending down to 47.57, cultures pending   Diet: regular   Bowel/Bladder: continence  Skin Concerns: scattered bruises to arm and legs. L thumb puncture site. Band-Aid on.   Drains/Devices: PIV  Patient Stated Goal for Today: rest      [Awake] : awake [Alert] : alert [Acute Distress] : no acute distress [Mass] : no breast mass [Nipple Discharge] : no nipple discharge [Axillary LAD] : no axillary lymphadenopathy [Soft] : soft [Tender] : non tender [Oriented x3] : oriented to person, place, and time [Normal] : uterus [Discharge] : a  ~M vaginal discharge was present [Moderate] : moderate [Clear] : clear [Watery] : watery [No Bleeding] : there was no active vaginal bleeding [Uterine Adnexae] : were not tender and not enlarged

## 2025-05-30 ENCOUNTER — NON-APPOINTMENT (OUTPATIENT)
Age: 50
End: 2025-05-30

## 2025-06-03 ENCOUNTER — NON-APPOINTMENT (OUTPATIENT)
Age: 50
End: 2025-06-03

## 2025-09-05 ENCOUNTER — RESULT REVIEW (OUTPATIENT)
Age: 50
End: 2025-09-05

## 2025-09-05 ENCOUNTER — APPOINTMENT (OUTPATIENT)
Dept: HEMATOLOGY ONCOLOGY | Facility: CLINIC | Age: 50
End: 2025-09-05
Payer: COMMERCIAL

## 2025-09-05 VITALS
RESPIRATION RATE: 16 BRPM | TEMPERATURE: 97 F | SYSTOLIC BLOOD PRESSURE: 137 MMHG | WEIGHT: 207.23 LBS | OXYGEN SATURATION: 97 % | HEART RATE: 77 BPM | DIASTOLIC BLOOD PRESSURE: 87 MMHG | BODY MASS INDEX: 38.14 KG/M2 | HEIGHT: 62 IN

## 2025-09-05 DIAGNOSIS — C50.912 MALIGNANT NEOPLASM OF UNSPECIFIED SITE OF LEFT FEMALE BREAST: ICD-10-CM

## 2025-09-05 LAB
ALBUMIN SERPL ELPH-MCNC: 4.3 G/DL
ALP BLD-CCNC: 80 U/L
ALT SERPL-CCNC: 13 U/L
ANION GAP SERPL CALC-SCNC: 12 MMOL/L
AST SERPL-CCNC: 22 U/L
BILIRUB SERPL-MCNC: 0.4 MG/DL
BUN SERPL-MCNC: 14 MG/DL
CALCIUM SERPL-MCNC: 9.5 MG/DL
CHLORIDE SERPL-SCNC: 106 MMOL/L
CO2 SERPL-SCNC: 22 MMOL/L
CREAT SERPL-MCNC: 0.69 MG/DL
EGFRCR SERPLBLD CKD-EPI 2021: 107 ML/MIN/1.73M2
GLUCOSE SERPL-MCNC: 106 MG/DL
POTASSIUM SERPL-SCNC: 4.7 MMOL/L
PROT SERPL-MCNC: 8 G/DL
SODIUM SERPL-SCNC: 140 MMOL/L

## 2025-09-05 PROCEDURE — 99214 OFFICE O/P EST MOD 30 MIN: CPT

## 2025-09-05 PROCEDURE — G2211 COMPLEX E/M VISIT ADD ON: CPT | Mod: NC

## (undated) DEVICE — VENODYNE/SCD SLEEVE CALF MEDIUM

## (undated) DEVICE — LIGASURE SMALL JAW

## (undated) DEVICE — PACK GENERAL LAPAROSCOPY

## (undated) DEVICE — FOLEY TRAY 16FR 5CC LF UMETER CLOSED

## (undated) DEVICE — GLV 7 PROTEXIS (CREAM) MICRO

## (undated) DEVICE — TUBING HYDRO-SURG PLUS IRRIGATOR W SMOKEVAC & PROBE

## (undated) DEVICE — PREP BETADINE SPONGE STICKS

## (undated) DEVICE — PACK PERI GYN

## (undated) DEVICE — GLV 7.5 PROTEXIS (WHITE)

## (undated) DEVICE — ELCTR BOVIE PENCIL SMOKE EVACUATION

## (undated) DEVICE — DRAPE 3/4 SHEET 52X76"

## (undated) DEVICE — POSITIONER FOAM EGG CRATE ULNAR 2PCS (PINK)

## (undated) DEVICE — PREP CHLOROHEXIDINE 4% 118CC KIT

## (undated) DEVICE — Device

## (undated) DEVICE — TROCAR COVIDIEN VERSAONE FIXATION CANNULA 5MM

## (undated) DEVICE — SOL IRR POUR H2O 500ML

## (undated) DEVICE — SUT PDS II 2-0 27" CT-1

## (undated) DEVICE — TUBING SUCTION NONCONDUCTIVE 6MM X 12FT

## (undated) DEVICE — TUBING OLYMPUS INSUFFLATION

## (undated) DEVICE — MARKING PEN W RULER

## (undated) DEVICE — DRAPE MAGNETIC INSTRUMENT MEDIUM

## (undated) DEVICE — POSITIONER PINK PAD PIGAZZI SYSTEM

## (undated) DEVICE — POSITIONER FOAM HEADREST (PINK)

## (undated) DEVICE — SUT SOFSILK 2-0 18" C-23

## (undated) DEVICE — SUT VICRYL 0 36" CT-1 UNDYED

## (undated) DEVICE — TIP METZENBAUM SCISSOR MONOPOLAR ENDOCUT (ORANGE)

## (undated) DEVICE — SUT MONOCRYL 4-0 27" PS-2 UNDYED

## (undated) DEVICE — SUT POLYSORB 2-0 30" V-20 UNDYED

## (undated) DEVICE — DRSG STERISTRIPS 0.5 X 4"

## (undated) DEVICE — SPONGE PEANUT AUTO COUNT

## (undated) DEVICE — SOL IRR POUR NS 0.9% 500ML

## (undated) DEVICE — SUT VICRYL 0 27" UR-6

## (undated) DEVICE — WARMING BLANKET LOWER ADULT

## (undated) DEVICE — SYR SAFE TB 1CC 27G X 0.5

## (undated) DEVICE — ELCTR STRYKER NEPTUNE SMOKE EVACUATION PENCIL (GREEN)

## (undated) DEVICE — PACK MINOR

## (undated) DEVICE — DRAPE INSTRUMENT POUCH 6.75" X 11"

## (undated) DEVICE — TROCAR COVIDIEN VERSAPORT BLADELESS OPTICAL 5MM STANDARD

## (undated) DEVICE — NDL HYPO REGULAR BEVEL 25G X 1.5" (BLUE)

## (undated) DEVICE — SPECIMEN CONTAINER 100ML

## (undated) DEVICE — TUBING INSUFFLATION LAP FILTER 10FT

## (undated) DEVICE — SYR LUER LOK 10CC

## (undated) DEVICE — GLV 8 PROTEXIS (BLUE)

## (undated) DEVICE — POSITIONER PURPLE ARM ONE STEP (LARGE)

## (undated) DEVICE — NDL HYPO SAFE 25G X 1.5" (ORANGE)

## (undated) DEVICE — DRSG TEGADERM 2.5X3"

## (undated) DEVICE — PACK D&C

## (undated) DEVICE — D HELP - CLEARVIEW CLEARIFY SYSTEM

## (undated) DEVICE — TUBING IRR SET FOR CYSTOSCOPY 77"

## (undated) DEVICE — DRAPE 1/2 SHEET 40X57"

## (undated) DEVICE — POSITIONER STRAP ARMBOARD VELCRO TS-30

## (undated) DEVICE — SUT POLYSORB 3-0 30" V-20 UNDYED

## (undated) DEVICE — LIGASURE MARYLAND 37CM

## (undated) DEVICE — GAMMA SLEEVE DISPOSABLE